# Patient Record
Sex: FEMALE | Race: WHITE | NOT HISPANIC OR LATINO | Employment: FULL TIME | URBAN - METROPOLITAN AREA
[De-identification: names, ages, dates, MRNs, and addresses within clinical notes are randomized per-mention and may not be internally consistent; named-entity substitution may affect disease eponyms.]

---

## 2018-05-28 ENCOUNTER — HOSPITAL ENCOUNTER (EMERGENCY)
Facility: HOSPITAL | Age: 23
Discharge: HOME | End: 2018-05-28
Attending: EMERGENCY MEDICINE | Admitting: EMERGENCY MEDICINE
Payer: MEDICAID

## 2018-05-28 VITALS
TEMPERATURE: 99 F | BODY MASS INDEX: 28.34 KG/M2 | HEART RATE: 96 BPM | DIASTOLIC BLOOD PRESSURE: 62 MMHG | RESPIRATION RATE: 20 BRPM | HEIGHT: 58 IN | SYSTOLIC BLOOD PRESSURE: 122 MMHG | OXYGEN SATURATION: 100 % | WEIGHT: 135 LBS

## 2018-05-28 DIAGNOSIS — R31.9 URINARY TRACT INFECTION WITH HEMATURIA, SITE UNSPECIFIED: ICD-10-CM

## 2018-05-28 DIAGNOSIS — N20.0 NEPHROLITHIASIS: Primary | ICD-10-CM

## 2018-05-28 DIAGNOSIS — N39.0 URINARY TRACT INFECTION WITH HEMATURIA, SITE UNSPECIFIED: ICD-10-CM

## 2018-05-28 DIAGNOSIS — N76.0 BACTERIAL VAGINOSIS: ICD-10-CM

## 2018-05-28 DIAGNOSIS — B96.89 BACTERIAL VAGINOSIS: ICD-10-CM

## 2018-05-28 LAB
ALBUMIN SERPL BCP-MCNC: 3.8 G/DL
ALP SERPL-CCNC: 87 U/L
ALT SERPL W/O P-5'-P-CCNC: 18 U/L
ANION GAP SERPL CALC-SCNC: 8 MMOL/L
AST SERPL-CCNC: 17 U/L
B-HCG UR QL: NEGATIVE
BACTERIA #/AREA URNS HPF: ABNORMAL /HPF
BACTERIA #/AREA URNS HPF: ABNORMAL /HPF
BACTERIA GENITAL QL WET PREP: ABNORMAL
BASOPHILS # BLD AUTO: 0.01 K/UL
BASOPHILS NFR BLD: 0.1 %
BILIRUB SERPL-MCNC: 0.5 MG/DL
BILIRUB UR QL STRIP: NEGATIVE
BILIRUB UR QL STRIP: NEGATIVE
BUN SERPL-MCNC: 13 MG/DL
CALCIUM SERPL-MCNC: 9.1 MG/DL
CHLORIDE SERPL-SCNC: 108 MMOL/L
CLARITY UR: ABNORMAL
CLARITY UR: CLEAR
CLUE CELLS VAG QL WET PREP: ABNORMAL
CO2 SERPL-SCNC: 22 MMOL/L
COLOR UR: YELLOW
COLOR UR: YELLOW
CREAT SERPL-MCNC: 0.8 MG/DL
CTP QC/QA: YES
DIFFERENTIAL METHOD: ABNORMAL
EOSINOPHIL # BLD AUTO: 0 K/UL
EOSINOPHIL NFR BLD: 0.4 %
ERYTHROCYTE [DISTWIDTH] IN BLOOD BY AUTOMATED COUNT: 13.4 %
EST. GFR  (AFRICAN AMERICAN): >60 ML/MIN/1.73 M^2
EST. GFR  (NON AFRICAN AMERICAN): >60 ML/MIN/1.73 M^2
FILAMENT FUNGI VAG WET PREP-#/AREA: ABNORMAL
GLUCOSE SERPL-MCNC: 109 MG/DL
GLUCOSE UR QL STRIP: NEGATIVE
GLUCOSE UR QL STRIP: NEGATIVE
HCT VFR BLD AUTO: 39.7 %
HGB BLD-MCNC: 13.2 G/DL
HGB UR QL STRIP: ABNORMAL
HGB UR QL STRIP: ABNORMAL
KETONES UR QL STRIP: ABNORMAL
KETONES UR QL STRIP: NEGATIVE
LEUKOCYTE ESTERASE UR QL STRIP: ABNORMAL
LEUKOCYTE ESTERASE UR QL STRIP: NEGATIVE
LYMPHOCYTES # BLD AUTO: 1.2 K/UL
LYMPHOCYTES NFR BLD: 11.2 %
MCH RBC QN AUTO: 30.6 PG
MCHC RBC AUTO-ENTMCNC: 33.2 G/DL
MCV RBC AUTO: 92 FL
MICROSCOPIC COMMENT: ABNORMAL
MICROSCOPIC COMMENT: ABNORMAL
MONOCYTES # BLD AUTO: 0.4 K/UL
MONOCYTES NFR BLD: 3.9 %
NEUTROPHILS # BLD AUTO: 9 K/UL
NEUTROPHILS NFR BLD: 84.2 %
NITRITE UR QL STRIP: NEGATIVE
NITRITE UR QL STRIP: NEGATIVE
PH UR STRIP: 7 [PH] (ref 5–8)
PH UR STRIP: 8 [PH] (ref 5–8)
PLATELET # BLD AUTO: 250 K/UL
PMV BLD AUTO: 10.8 FL
POTASSIUM SERPL-SCNC: 4.2 MMOL/L
PROT SERPL-MCNC: 7.2 G/DL
PROT UR QL STRIP: ABNORMAL
PROT UR QL STRIP: NEGATIVE
RBC # BLD AUTO: 4.31 M/UL
RBC #/AREA URNS HPF: 5 /HPF (ref 0–4)
RBC #/AREA URNS HPF: 5 /HPF (ref 0–4)
SODIUM SERPL-SCNC: 138 MMOL/L
SP GR UR STRIP: 1.01 (ref 1–1.03)
SP GR UR STRIP: 1.01 (ref 1–1.03)
SPECIMEN SOURCE: ABNORMAL
SQUAMOUS #/AREA URNS HPF: 0 /HPF
SQUAMOUS #/AREA URNS HPF: 6 /HPF
T VAGINALIS GENITAL QL WET PREP: ABNORMAL
URN SPEC COLLECT METH UR: ABNORMAL
URN SPEC COLLECT METH UR: ABNORMAL
UROBILINOGEN UR STRIP-ACNC: NEGATIVE EU/DL
UROBILINOGEN UR STRIP-ACNC: NEGATIVE EU/DL
WBC # BLD AUTO: 10.73 K/UL
WBC #/AREA URNS HPF: 35 /HPF (ref 0–5)
WBC #/AREA URNS HPF: 4 /HPF (ref 0–5)
WBC #/AREA VAG WET PREP: ABNORMAL
YEAST GENITAL QL WET PREP: ABNORMAL

## 2018-05-28 PROCEDURE — 87210 SMEAR WET MOUNT SALINE/INK: CPT

## 2018-05-28 PROCEDURE — 25000003 PHARM REV CODE 250: Performed by: PHYSICIAN ASSISTANT

## 2018-05-28 PROCEDURE — 99285 EMERGENCY DEPT VISIT HI MDM: CPT | Mod: 25

## 2018-05-28 PROCEDURE — 96361 HYDRATE IV INFUSION ADD-ON: CPT

## 2018-05-28 PROCEDURE — 63600175 PHARM REV CODE 636 W HCPCS: Performed by: PHYSICIAN ASSISTANT

## 2018-05-28 PROCEDURE — 81000 URINALYSIS NONAUTO W/SCOPE: CPT | Mod: 91

## 2018-05-28 PROCEDURE — 96365 THER/PROPH/DIAG IV INF INIT: CPT | Mod: 59

## 2018-05-28 PROCEDURE — 87088 URINE BACTERIA CULTURE: CPT

## 2018-05-28 PROCEDURE — 87186 SC STD MICRODIL/AGAR DIL: CPT

## 2018-05-28 PROCEDURE — 80053 COMPREHEN METABOLIC PANEL: CPT

## 2018-05-28 PROCEDURE — 96375 TX/PRO/DX INJ NEW DRUG ADDON: CPT

## 2018-05-28 PROCEDURE — 25500020 PHARM REV CODE 255: Performed by: EMERGENCY MEDICINE

## 2018-05-28 PROCEDURE — 85025 COMPLETE CBC W/AUTO DIFF WBC: CPT

## 2018-05-28 PROCEDURE — 87086 URINE CULTURE/COLONY COUNT: CPT

## 2018-05-28 PROCEDURE — 87491 CHLMYD TRACH DNA AMP PROBE: CPT

## 2018-05-28 PROCEDURE — 81025 URINE PREGNANCY TEST: CPT | Performed by: PHYSICIAN ASSISTANT

## 2018-05-28 PROCEDURE — 87077 CULTURE AEROBIC IDENTIFY: CPT

## 2018-05-28 RX ORDER — ONDANSETRON 4 MG/1
4 TABLET, ORALLY DISINTEGRATING ORAL
Status: DISCONTINUED | OUTPATIENT
Start: 2018-05-28 | End: 2018-05-28

## 2018-05-28 RX ORDER — TAMSULOSIN HYDROCHLORIDE 0.4 MG/1
0.4 CAPSULE ORAL DAILY
Qty: 10 CAPSULE | Refills: 0 | Status: SHIPPED | OUTPATIENT
Start: 2018-05-28 | End: 2020-02-20

## 2018-05-28 RX ORDER — METRONIDAZOLE 7.5 MG/G
1 GEL VAGINAL 2 TIMES DAILY
Qty: 70 G | Refills: 0 | Status: SHIPPED | OUTPATIENT
Start: 2018-05-28 | End: 2018-06-02

## 2018-05-28 RX ORDER — CEPHALEXIN 500 MG/1
500 CAPSULE ORAL EVERY 12 HOURS
Qty: 14 CAPSULE | Refills: 0 | Status: SHIPPED | OUTPATIENT
Start: 2018-05-28 | End: 2018-06-04

## 2018-05-28 RX ORDER — AZITHROMYCIN 250 MG/1
1000 TABLET, FILM COATED ORAL
Status: COMPLETED | OUTPATIENT
Start: 2018-05-28 | End: 2018-05-28

## 2018-05-28 RX ORDER — MORPHINE SULFATE 4 MG/ML
4 INJECTION, SOLUTION INTRAMUSCULAR; INTRAVENOUS
Status: COMPLETED | OUTPATIENT
Start: 2018-05-28 | End: 2018-05-28

## 2018-05-28 RX ORDER — ONDANSETRON 4 MG/1
4 TABLET, ORALLY DISINTEGRATING ORAL EVERY 6 HOURS PRN
Qty: 20 TABLET | Refills: 0 | Status: SHIPPED | OUTPATIENT
Start: 2018-05-28 | End: 2020-02-20

## 2018-05-28 RX ORDER — PROCHLORPERAZINE EDISYLATE 5 MG/ML
10 INJECTION INTRAMUSCULAR; INTRAVENOUS
Status: COMPLETED | OUTPATIENT
Start: 2018-05-28 | End: 2018-05-28

## 2018-05-28 RX ORDER — KETOROLAC TROMETHAMINE 30 MG/ML
15 INJECTION, SOLUTION INTRAMUSCULAR; INTRAVENOUS
Status: COMPLETED | OUTPATIENT
Start: 2018-05-28 | End: 2018-05-28

## 2018-05-28 RX ORDER — HYDROCODONE BITARTRATE AND ACETAMINOPHEN 10; 325 MG/1; MG/1
1 TABLET ORAL EVERY 4 HOURS PRN
Qty: 18 TABLET | Refills: 0 | Status: SHIPPED | OUTPATIENT
Start: 2018-05-28 | End: 2020-02-20

## 2018-05-28 RX ADMIN — PROCHLORPERAZINE EDISYLATE 10 MG: 5 INJECTION INTRAMUSCULAR; INTRAVENOUS at 12:05

## 2018-05-28 RX ADMIN — SODIUM CHLORIDE 1000 ML: 0.9 INJECTION, SOLUTION INTRAVENOUS at 12:05

## 2018-05-28 RX ADMIN — AZITHROMYCIN 1000 MG: 250 TABLET, FILM COATED ORAL at 03:05

## 2018-05-28 RX ADMIN — MORPHINE SULFATE 4 MG: 4 INJECTION INTRAVENOUS at 12:05

## 2018-05-28 RX ADMIN — IOHEXOL 75 ML: 350 INJECTION, SOLUTION INTRAVENOUS at 01:05

## 2018-05-28 RX ADMIN — KETOROLAC TROMETHAMINE 15 MG: 30 INJECTION, SOLUTION INTRAMUSCULAR at 02:05

## 2018-05-28 RX ADMIN — CEFTRIAXONE 1 G: 1 INJECTION, SOLUTION INTRAVENOUS at 03:05

## 2018-05-28 NOTE — ED PROVIDER NOTES
Encounter Date: 5/28/2018       History     Chief Complaint   Patient presents with    Emesis     n/v/d with RLQ abdominal pain x 3 hours, denies fever. LMP 4/26, admits to being sexually active with no protection     Casie Copeland, a 22 y.o. female that presents via EMS to the ED for N/V/D and RLQ abdominal pain that started about 3 hours PTA.  Patient states that the pain is stabbing in nature and constant with fluctuations in intensity.  Denies any history of abdominal surgeries.  Denies any sick contacts.  Patient does attest to some vaginal discharge and she does practice unprotected sex.  History of STDs in the past.  No treatments tried.         The history is provided by the patient.     Review of patient's allergies indicates:  No Known Allergies  History reviewed. No pertinent past medical history.  History reviewed. No pertinent surgical history.  History reviewed. No pertinent family history.  Social History   Substance Use Topics    Smoking status: Never Smoker    Smokeless tobacco: Not on file    Alcohol use Not on file     Review of Systems   Constitutional: Negative for fever.   Gastrointestinal: Positive for abdominal pain, diarrhea, nausea and vomiting.   Genitourinary: Positive for dysuria, urgency and vaginal discharge.   Skin: Negative for color change and rash.   Allergic/Immunologic: Negative for immunocompromised state.   Neurological: Negative for dizziness.   Hematological: Negative for adenopathy.   Psychiatric/Behavioral: Negative for agitation and confusion.   All other systems reviewed and are negative.      Physical Exam     Initial Vitals [05/28/18 1200]   BP Pulse Resp Temp SpO2   (!) 114/56 82 18 98.5 °F (36.9 °C) 99 %      MAP       75.33         Physical Exam    Nursing note and vitals reviewed.  Constitutional: She appears well-developed and well-nourished. She is not diaphoretic. No distress.   HENT:   Head: Normocephalic and atraumatic.   Right Ear: External ear  normal.   Left Ear: External ear normal.   Nose: Nose normal.   Mouth/Throat: Oropharynx is clear and moist.   Eyes: Conjunctivae and EOM are normal.   Neck: Normal range of motion.   Cardiovascular: Normal rate, regular rhythm and normal heart sounds.   Pulmonary/Chest: Breath sounds normal. No respiratory distress. She has no wheezes. She has no rhonchi. She has no rales.   Abdominal: Soft. Normal appearance and bowel sounds are normal. She exhibits no distension. There is tenderness in the right lower quadrant. There is no rigidity, no rebound, no guarding, no CVA tenderness and negative Cummings's sign.   Genitourinary: Uterus normal. Pelvic exam was performed with patient supine. Cervix exhibits discharge (white, thick ) and friability. Cervix exhibits no motion tenderness. Vaginal discharge (white, thick ) found.   Musculoskeletal: Normal range of motion.   Neurological: She is alert and oriented to person, place, and time.   Skin: Skin is warm and dry. Capillary refill takes less than 2 seconds. No rash noted. No erythema.   Psychiatric: She has a normal mood and affect. Thought content normal.         ED Course   Procedures  Labs Reviewed   URINALYSIS - Abnormal; Notable for the following:        Result Value    Appearance, UA Hazy (*)     Protein, UA Trace (*)     Occult Blood UA 1+ (*)     Leukocytes, UA 3+ (*)     All other components within normal limits   CBC W/ AUTO DIFFERENTIAL - Abnormal; Notable for the following:     Gran # (ANC) 9.0 (*)     Gran% 84.2 (*)     Lymph% 11.2 (*)     Mono% 3.9 (*)     All other components within normal limits   COMPREHENSIVE METABOLIC PANEL - Abnormal; Notable for the following:     CO2 22 (*)     All other components within normal limits   VAGINAL SCREEN - Abnormal; Notable for the following:     Clue Cells, Wet Prep Occasional (*)     WBC - Vaginal Screen Moderate (*)     Bacteria - Vaginal Screen Moderate (*)     All other components within normal limits   URINALYSIS  MICROSCOPIC - Abnormal; Notable for the following:     RBC, UA 5 (*)     WBC, UA 35 (*)     Bacteria, UA Moderate (*)     All other components within normal limits   URINALYSIS - Abnormal; Notable for the following:     Ketones, UA Trace (*)     Occult Blood UA 1+ (*)     All other components within normal limits    Narrative:     Please repeat   URINALYSIS MICROSCOPIC - Abnormal; Notable for the following:     RBC, UA 5 (*)     All other components within normal limits    Narrative:     Please repeat   CULTURE, URINE   C. TRACHOMATIS/N. GONORRHOEAE BY AMP DNA   CULTURE, URINE   POCT URINE PREGNANCY        Imaging Results          CT Abdomen Pelvis With Contrast (Final result)     Abnormal  Result time 05/28/18 13:52:41    Final result by Brian Bishop MD (05/28/18 13:52:41)                 Impression:      1. Mild right-sided hydroureteronephrosis secondary to a 4 mm calculus within the distal right ureter.  There is asymmetric enhancement pattern of the bilateral kidneys with heterogeneous appearance of the right renal lower pole which could indicate sequela of right-sided obstructive uropathy and/or potential right-sided pyelonephritis.  2. Additional right-sided nephrolithiasis.  3. Appendix not identified for which appendicitis cannot be entirely excluded; however, no convincing evidence for acute appendicitis.  Correlate clinically.  4. Left adnexal 1.9 cm probable functioning/hemorrhagic dominant follicle.  5. Small volume complex free fluid within the pelvis, nonspecific but can be seen with recent follicle or cyst rupture versus high proteinaceous fluid from infectious or inflammatory etiology including PID, among others.  Correlate clinically.  This report was flagged in Epic as abnormal.      Electronically signed by: Brian Bishop MD  Date:    05/28/2018  Time:    13:52             Narrative:    EXAMINATION:  CT ABDOMEN PELVIS WITH CONTRAST    CLINICAL HISTORY:  RLQ abdominal pain;    TECHNIQUE:  Low  dose axial images, sagittal and coronal reformations were obtained from the lung bases to the pubic symphysis following the IV administration of 75 mL of Omnipaque 350 contrast.  No oral contrast.    COMPARISON:  CT abdomen and pelvis 06/17/2013 and pelvic ultrasound 12/21/2011    FINDINGS:  Included lung bases are clear.  Base of the heart is within normal limits.    Liver, gallbladder, pancreas, spleen, stomach, duodenum bilateral adrenal glands are within normal limits.  No biliary ductal dilatation.    Bilateral kidneys are normal in size, shape and location.  There is mild right-sided hydroureteronephrosis secondary to a 4 mm calculus within the distal right ureter.  Additional few small nephroliths at the right renal lower pole.  No left hydronephrosis.  There is asymmetric enhancement pattern of the bilateral kidneys with heterogeneous appearance of the right renal lower pole.  Ureter is within normal limits.  Urinary bladder is suboptimally distended.  The uterus and right adnexal region are grossly within normal limits.  There is a 1.9 cm oval-shaped low-attenuation focus with peripheral enhancement at the left adnexal region suggestive of a functioning/hemorrhagic dominant follicle.  There is small volume intermediate attenuation free fluid.    Aorta is within normal.  No ascites, free air or lymphadenopathy.    Small fat containing umbilical hernia.  Appendix is not identified.  The terminal ileum is within normal limits.  No evidence of bowel obstruction or inflammation.  No pneumatosis or portal venous gas.    Included osseous structures appear intact.                                   Medical Decision Making:   Initial Assessment:   RLQ abdominal pain, N/V/D with acute onset  Differential Diagnosis:   Appendicitis, colitis, diverticulitis, ovarian torsion     Clinical Tests:   Lab Tests: Ordered and Reviewed  The following lab test(s) were unremarkable: CBC, CMP and Urinalysis  Radiological Study:  Ordered and Reviewed  ED Management:  Patient presents to ED evaluation of acute right-sided abdominal pain 3 hours prior to arrival with associated nausea, vomiting or diarrhea.  CBC and CMP showed no acute abnormalities.  UA initially looked contaminated and repeat UA showed 5 white blood cells and nitrite negative.  Urine was still sent for culture.  CT shows evidence of 4 mm stones.  Given questionable UA results and possibility of PID, patient was given IV rocephine in ED with azithromycin.  Cervix shows friability with white, thick discharge, vaginal screen shows + clue cells.  Patient will be prescribed metrogel and keflex for UTI.  Given strict return precautions including fever, inability to urinate, intractable nausea and vomiting.  Patient expressed understanding and is in agreement with this plan.  Case discussed with supervising physician who agrees with plan.                 Attending Attestation:     Physician Attestation Statement for NP/PA:   I have conducted a face to face encounter with this patient in addition to the NP/PA, due to NP/PA Request    Other NP/PA Attestation Additions:    History of Present Illness: Agree; 22-year-old feel presents emergency department complaining of a few hours of right lower quadrant pain.  She reports a constant, stabbing pain that fluctuates in intensity without any notable exacerbating or alleviating factors.  Admits to some vaginal discharge.  Reports nausea with one or 2 episodes of nonbloody, nonbilious emesis and one episode of loose stool earlier today.  Denies any fever.  Denies any dysuria.  No other symptoms reported at this time.   Physical Exam: Agree   Medical Decision Making: Agree; CT shows kidney stone; patient also with clue cells and apparent UTI.  Given some IV Rocephin and by mouth azithromycin as well as prescriptions for antibiotics, instructions on home management, follow-up with primary care physician and urology, reasons to return to the  emergency room.                    Clinical Impression:   The primary encounter diagnosis was Nephrolithiasis. Diagnoses of Bacterial vaginosis and Urinary tract infection with hematuria, site unspecified were also pertinent to this visit.                           Claire Ball PA-C  05/28/18 1724       Esteban Banda MD  05/29/18 0074

## 2018-05-28 NOTE — ED NOTES
Care assumed.  Pt with continued RLQ pain, nausea is resolving at present.  IV NS bolus infusing to gravity to 18 g to left AC, site clear.  Skin pink/warm/dry.  Resp =/unlabored.  Pt reports RLQ pain is now 8/10 after receiving morphine and compazine but pain is still severe.

## 2018-05-29 ENCOUNTER — PES CALL (OUTPATIENT)
Dept: ADMINISTRATIVE | Facility: CLINIC | Age: 23
End: 2018-05-29

## 2018-05-29 LAB
C TRACH DNA SPEC QL NAA+PROBE: NOT DETECTED
N GONORRHOEA DNA SPEC QL NAA+PROBE: NOT DETECTED

## 2018-05-30 LAB — BACTERIA UR CULT: NORMAL

## 2019-11-12 ENCOUNTER — NURSE TRIAGE (OUTPATIENT)
Dept: ADMINISTRATIVE | Facility: CLINIC | Age: 24
End: 2019-11-12

## 2019-11-12 ENCOUNTER — HOSPITAL ENCOUNTER (EMERGENCY)
Facility: HOSPITAL | Age: 24
Discharge: HOME | End: 2019-11-13
Attending: EMERGENCY MEDICINE
Payer: MEDICAID

## 2019-11-12 DIAGNOSIS — J06.9 VIRAL URI WITH COUGH: ICD-10-CM

## 2019-11-12 DIAGNOSIS — N30.00 ACUTE CYSTITIS WITHOUT HEMATURIA: Primary | ICD-10-CM

## 2019-11-12 DIAGNOSIS — R50.9 FEVER, UNSPECIFIED: ICD-10-CM

## 2019-11-12 LAB
B-HCG UR QL: NEGATIVE
BACTERIA #/AREA URNS HPF: ABNORMAL /HPF
BILIRUB UR QL STRIP: NEGATIVE
CLARITY UR: CLEAR
COLOR UR: YELLOW
CTP QC/QA: YES
GLUCOSE UR QL STRIP: NEGATIVE
HGB UR QL STRIP: ABNORMAL
HYALINE CASTS #/AREA URNS LPF: 0 /LPF
INFLUENZA A, MOLECULAR: NEGATIVE
INFLUENZA B, MOLECULAR: NEGATIVE
KETONES UR QL STRIP: NEGATIVE
LEUKOCYTE ESTERASE UR QL STRIP: ABNORMAL
MICROSCOPIC COMMENT: ABNORMAL
NITRITE UR QL STRIP: POSITIVE
PH UR STRIP: 6 [PH] (ref 5–8)
PROT UR QL STRIP: ABNORMAL
RBC #/AREA URNS HPF: 10 /HPF (ref 0–4)
SP GR UR STRIP: >=1.03 (ref 1–1.03)
SPECIMEN SOURCE: NORMAL
URN SPEC COLLECT METH UR: ABNORMAL
UROBILINOGEN UR STRIP-ACNC: 1 EU/DL
WBC #/AREA URNS HPF: 85 /HPF (ref 0–5)

## 2019-11-12 PROCEDURE — 81025 URINE PREGNANCY TEST: CPT | Performed by: EMERGENCY MEDICINE

## 2019-11-12 PROCEDURE — 96361 HYDRATE IV INFUSION ADD-ON: CPT

## 2019-11-12 PROCEDURE — 87088 URINE BACTERIA CULTURE: CPT

## 2019-11-12 PROCEDURE — 99284 EMERGENCY DEPT VISIT MOD MDM: CPT | Mod: 25

## 2019-11-12 PROCEDURE — 87186 SC STD MICRODIL/AGAR DIL: CPT

## 2019-11-12 PROCEDURE — 81000 URINALYSIS NONAUTO W/SCOPE: CPT

## 2019-11-12 PROCEDURE — 87502 INFLUENZA DNA AMP PROBE: CPT

## 2019-11-12 PROCEDURE — 87077 CULTURE AEROBIC IDENTIFY: CPT

## 2019-11-12 PROCEDURE — 96375 TX/PRO/DX INJ NEW DRUG ADDON: CPT

## 2019-11-12 PROCEDURE — 63600175 PHARM REV CODE 636 W HCPCS: Performed by: EMERGENCY MEDICINE

## 2019-11-12 PROCEDURE — 87086 URINE CULTURE/COLONY COUNT: CPT

## 2019-11-12 PROCEDURE — 96365 THER/PROPH/DIAG IV INF INIT: CPT

## 2019-11-12 RX ORDER — ONDANSETRON 2 MG/ML
4 INJECTION INTRAMUSCULAR; INTRAVENOUS
Status: COMPLETED | OUTPATIENT
Start: 2019-11-12 | End: 2019-11-12

## 2019-11-12 RX ORDER — KETOROLAC TROMETHAMINE 30 MG/ML
15 INJECTION, SOLUTION INTRAMUSCULAR; INTRAVENOUS
Status: COMPLETED | OUTPATIENT
Start: 2019-11-12 | End: 2019-11-12

## 2019-11-12 RX ADMIN — KETOROLAC TROMETHAMINE 15 MG: 30 INJECTION, SOLUTION INTRAMUSCULAR at 11:11

## 2019-11-12 RX ADMIN — ONDANSETRON 4 MG: 2 INJECTION INTRAMUSCULAR; INTRAVENOUS at 11:11

## 2019-11-12 RX ADMIN — SODIUM CHLORIDE, SODIUM LACTATE, POTASSIUM CHLORIDE, AND CALCIUM CHLORIDE 1000 ML: .6; .31; .03; .02 INJECTION, SOLUTION INTRAVENOUS at 11:11

## 2019-11-13 VITALS
SYSTOLIC BLOOD PRESSURE: 108 MMHG | DIASTOLIC BLOOD PRESSURE: 65 MMHG | TEMPERATURE: 99 F | HEART RATE: 85 BPM | HEIGHT: 59 IN | RESPIRATION RATE: 14 BRPM | BODY MASS INDEX: 24.19 KG/M2 | WEIGHT: 120 LBS | OXYGEN SATURATION: 98 %

## 2019-11-13 PROCEDURE — 63600175 PHARM REV CODE 636 W HCPCS: Performed by: EMERGENCY MEDICINE

## 2019-11-13 RX ORDER — CEPHALEXIN 500 MG/1
500 CAPSULE ORAL EVERY 8 HOURS
Qty: 21 CAPSULE | Refills: 0 | Status: SHIPPED | OUTPATIENT
Start: 2019-11-13 | End: 2019-11-20

## 2019-11-13 RX ADMIN — CEFTRIAXONE 1 G: 1 INJECTION, SOLUTION INTRAVENOUS at 12:11

## 2019-11-13 NOTE — ED NOTES
"Patient states she has been having a headache, body aches, and fever x a "couple of days." She denies any dysuria however states she has had cloudy urine but thinks "that's because im just not healthy." Patient states she has not had the flu shot yet this year. She also admits to intermittent RLQ pain states its sharp but comes at random times.   "

## 2019-11-13 NOTE — TELEPHONE ENCOUNTER
Reason for Disposition   Unable to walk, or can only walk with assistance (e.g., requires support)    Additional Information   Negative: Difficult to awaken or acting confused (e.g., disoriented, slurred speech)   Negative: [1] Weakness of the face, arm or leg on one side of the body AND [2] new onset   Negative: [1] Numbness of the face, arm or leg on one side of the body AND [2] new onset   Negative: [1] Loss of speech or garbled speech AND [2] new onset   Negative: Passed out (i.e., lost consciousness, collapsed and was not responding)   Negative: Sounds like a life-threatening emergency to the triager   Negative: Followed a head injury within last 3 days   Negative: Pregnant   Negative: Traumatic Brain Injury (TBI) is suspected    Protocols used: HEADACHE-A-AH    Pt called to report she has a severe headache and stated she cannot walk normal. Pt stated she is now wobbling. Per triage protocol, Pt advised to go to ED now and not to drive. Pt verbalized understanding.

## 2019-11-13 NOTE — ED PROVIDER NOTES
Encounter Date: 11/12/2019    SCRIBE #1 NOTE: I, Kerri Mcginnis, am scribing for, and in the presence of,  Dr. Quiroz. I have scribed the entire note.       History     Chief Complaint   Patient presents with    Fever     Patient presents to ED secondary to subjective fever and migraine x2 days. Also accompanied by cough, congestion. Last dose of equate taken one hour pta.      Casie Copeland is a 24 y.o. female who  has no past medical history on file.    The patient presents to the ED due to multiple complaints, including cough/congestion, fever, body aches and headache.   Patient reports cough/congestion has been ongoing for the last week.   She reports 2 days ago, she noticed fever, nausea, and headache that has been persistent.   She endorses history of migraine headaches, and states this headache is similar in character to prior headaches.   She denies any chills, vomiting, diarrhea, abdominal pain, neck pain/stiffness, numbness/weakness, urinary symptoms, or any other complaints.   She denies any known sick contacts.    The history is provided by the patient.     Review of patient's allergies indicates:   Allergen Reactions    Clindamycin      History reviewed. No pertinent past medical history.  History reviewed. No pertinent surgical history.  History reviewed. No pertinent family history.  Social History     Tobacco Use    Smoking status: Never Smoker   Substance Use Topics    Alcohol use: Not on file    Drug use: Not on file     Review of Systems   Constitutional: Positive for fever. Negative for chills.   HENT: Positive for congestion. Negative for sore throat.    Respiratory: Positive for cough. Negative for shortness of breath.    Cardiovascular: Negative for chest pain.   Gastrointestinal: Positive for nausea. Negative for vomiting.   Genitourinary: Negative for dysuria, frequency and urgency.   Musculoskeletal: Positive for myalgias. Negative for back pain, neck pain and neck stiffness.    Skin: Negative for rash and wound.   Neurological: Positive for headaches. Negative for syncope and weakness.   Hematological: Does not bruise/bleed easily.   Psychiatric/Behavioral: Negative for agitation, behavioral problems and confusion.       Physical Exam     Initial Vitals [11/12/19 2210]   BP Pulse Resp Temp SpO2   111/74 (!) 126 18 99.6 °F (37.6 °C) 98 %      MAP       --         Physical Exam    Nursing note and vitals reviewed.  Constitutional: She appears well-developed and well-nourished. She is not diaphoretic. No distress.   Well-appearing, NAD.   HENT:   Head: Normocephalic and atraumatic.   Mouth/Throat: Oropharynx is clear and moist.   Eyes: EOM are normal. Pupils are equal, round, and reactive to light.   Neck: No tracheal deviation present.   Cardiovascular: Regular rhythm, normal heart sounds and intact distal pulses. Tachycardia present.    Pulmonary/Chest: Breath sounds normal. No stridor. No respiratory distress. She has no wheezes.   Abdominal: Soft. Bowel sounds are normal. She exhibits no distension and no mass. There is no tenderness.   Musculoskeletal: Normal range of motion. She exhibits no edema.   Neurological: She is alert and oriented to person, place, and time. No cranial nerve deficit or sensory deficit.   Skin: Skin is warm and dry. Capillary refill takes less than 2 seconds. No rash noted.   Psychiatric: She has a normal mood and affect. Her behavior is normal. Thought content normal.         ED Course   Procedures  Labs Reviewed   CULTURE, URINE - Abnormal; Notable for the following components:       Result Value    Urine Culture, Routine   (*)     Value: PRESUMPTIVE E COLI  >100,000 cfu/ml  Identification and susceptibility pending      All other components within normal limits    Narrative:     Preferred Collection Type->Urine, Clean Catch   URINALYSIS, REFLEX TO URINE CULTURE - Abnormal; Notable for the following components:    Specific Gravity, UA >=1.030 (*)     Protein,  UA 1+ (*)     Occult Blood UA 3+ (*)     Nitrite, UA Positive (*)     Leukocytes, UA 1+ (*)     All other components within normal limits    Narrative:     Preferred Collection Type->Urine, Clean Catch   URINALYSIS MICROSCOPIC - Abnormal; Notable for the following components:    RBC, UA 10 (*)     WBC, UA 85 (*)     Bacteria Many (*)     All other components within normal limits    Narrative:     Preferred Collection Type->Urine, Clean Catch   INFLUENZA A & B BY MOLECULAR   POCT URINE PREGNANCY             Medical Decision Making:   History:   Old Medical Records: I decided to obtain old medical records.  Initial Assessment:   25 yo F with multiple complaints, including cough/congestion, fever, body aches, headache.  Mild tachycardia on arrival, vitals otherwise reassuring.  Will obtain UA, treat symptomatically with IVF, and reassess.  Differential Diagnosis:   Differential Diagnosis includes, but is not limited to:  Sepsis, bacteremia, UTI, pneumonia, cellulitis, abscess, indwelling line/catheter infection, cholecystitis, viral URI, gastroenteritis, viral syndrome, sinusitis, otitis media/externa, neoplasm, drug reaction, serotonin syndrome, intoxication/withdrawal syndrome.    Clinical Tests:   Lab Tests: Ordered and Reviewed  ED Management:  UA consistent with UTI. IV Rocephin given in ED.  On reassessment, vitals have improved, patient remains well-appearing.   Will RX Keflex for UTI, recommend close f/u with PCP or return to ED for worsening symptoms, pain, fever, N/V, or any other concerns.    On re-evaluation, the patient's status has improved.  After complete ED evaluation, clinical impression is most consistent with UTI, fever.  PCP follow-up within 2-3 days was recommended.    After taking into careful account the patient's history, physical exam findings, as well as empirical and objective data obtained throughout ED workup, I feel no emergent medical condition has been identified. No further evaluation  or admission was felt to be required, and the patient is stable for discharge from the ED. The patient and any additional family present were updated with test results, overall clinical impression, and recommended further plan of care, including discharge instructions as provided and outpatient follow-up for continued evaluation and management as needed. All questions were answered. The patient expressed understanding and agreed with current plan for discharge and follow-up plan of care. Strict ED return precautions were provided, including return/worsening of current symptoms, new symptoms, or any other concerns.                                   Clinical Impression:       ICD-10-CM ICD-9-CM   1. Acute cystitis without hematuria N30.00 595.0   2. Fever, unspecified R50.9 780.60   3. Viral URI with cough J06.9 465.9    B97.89        Disposition:   Disposition: Discharged  Condition: Stable      I, Dr. Ignacio Quiroz, personally performed the services described in this documentation. All medical record entries made by the scribe were at my direction and in my presence.  I have reviewed the chart and agree that the record reflects my personal performance and is accurate and complete.     Ignacio Quiroz MD.           Ignacio Quiroz MD  11/14/19 2746

## 2019-11-15 LAB — BACTERIA UR CULT: ABNORMAL

## 2020-01-03 ENCOUNTER — HOSPITAL ENCOUNTER (EMERGENCY)
Facility: HOSPITAL | Age: 25
Discharge: HOME | End: 2020-01-03
Attending: EMERGENCY MEDICINE
Payer: MEDICAID

## 2020-01-03 VITALS
SYSTOLIC BLOOD PRESSURE: 119 MMHG | HEIGHT: 58 IN | DIASTOLIC BLOOD PRESSURE: 69 MMHG | OXYGEN SATURATION: 99 % | WEIGHT: 125 LBS | BODY MASS INDEX: 26.24 KG/M2 | RESPIRATION RATE: 16 BRPM | TEMPERATURE: 98 F | HEART RATE: 94 BPM

## 2020-01-03 DIAGNOSIS — R51.9 NONINTRACTABLE HEADACHE, UNSPECIFIED CHRONICITY PATTERN, UNSPECIFIED HEADACHE TYPE: Primary | ICD-10-CM

## 2020-01-03 LAB
B-HCG UR QL: NEGATIVE
BUN SERPL-MCNC: 16 MG/DL (ref 6–30)
CHLORIDE SERPL-SCNC: 105 MMOL/L (ref 95–110)
CREAT SERPL-MCNC: 0.7 MG/DL (ref 0.5–1.4)
CTP QC/QA: YES
GLUCOSE SERPL-MCNC: 82 MG/DL (ref 70–110)
HCT VFR BLD CALC: 42 %PCV (ref 36–54)
POC IONIZED CALCIUM: 1.13 MMOL/L (ref 1.06–1.42)
POC TCO2 (MEASURED): 23 MMOL/L (ref 23–29)
POTASSIUM BLD-SCNC: 3.7 MMOL/L (ref 3.5–5.1)
SAMPLE: NORMAL
SODIUM BLD-SCNC: 138 MMOL/L (ref 136–145)

## 2020-01-03 PROCEDURE — 99284 EMERGENCY DEPT VISIT MOD MDM: CPT | Mod: 25

## 2020-01-03 PROCEDURE — 99284 EMERGENCY DEPT VISIT MOD MDM: CPT | Mod: ,,, | Performed by: PHYSICIAN ASSISTANT

## 2020-01-03 PROCEDURE — 63600175 PHARM REV CODE 636 W HCPCS: Performed by: PHYSICIAN ASSISTANT

## 2020-01-03 PROCEDURE — 81025 URINE PREGNANCY TEST: CPT | Performed by: PHYSICIAN ASSISTANT

## 2020-01-03 PROCEDURE — 96374 THER/PROPH/DIAG INJ IV PUSH: CPT

## 2020-01-03 PROCEDURE — 96361 HYDRATE IV INFUSION ADD-ON: CPT

## 2020-01-03 PROCEDURE — 99284 PR EMERGENCY DEPT VISIT,LEVEL IV: ICD-10-PCS | Mod: ,,, | Performed by: PHYSICIAN ASSISTANT

## 2020-01-03 PROCEDURE — 25000003 PHARM REV CODE 250: Performed by: PHYSICIAN ASSISTANT

## 2020-01-03 RX ORDER — PROCHLORPERAZINE EDISYLATE 5 MG/ML
10 INJECTION INTRAMUSCULAR; INTRAVENOUS
Status: COMPLETED | OUTPATIENT
Start: 2020-01-03 | End: 2020-01-03

## 2020-01-03 RX ORDER — ACETAMINOPHEN 500 MG
1000 TABLET ORAL
Status: COMPLETED | OUTPATIENT
Start: 2020-01-03 | End: 2020-01-03

## 2020-01-03 RX ADMIN — ACETAMINOPHEN 1000 MG: 500 TABLET ORAL at 03:01

## 2020-01-03 RX ADMIN — SODIUM CHLORIDE 1000 ML: 0.9 INJECTION, SOLUTION INTRAVENOUS at 03:01

## 2020-01-03 RX ADMIN — PROCHLORPERAZINE EDISYLATE 10 MG: 5 INJECTION INTRAMUSCULAR; INTRAVENOUS at 03:01

## 2020-01-03 NOTE — ED PROVIDER NOTES
Encounter Date: 1/3/2020       History     Chief Complaint   Patient presents with    Migraine     x5hrs, reports photosensitivty.      Patient is a 24 year old female with no significant PMHx. She presents to the ED for headache. She reports having a left frontal headache onset today approximately five hours PTA. Hx of similar headache in the past. Describes pain as constant and throbbing. Reports pain waxing and waning in intensity. Rates pain 6/10. Reports intermittent blurred vision and nausea. Denies hx of anticoagulation. She denies visual disturbances, slurred speech, paresthesias, gait abnormalities, or motor weakness. She denies fever,chills, vomiting, sob, chest pain, abd pain, dysuria, diarrhea, or constipation. She is a non smoker and denies alcohol use.    The history is provided by the patient and medical records. No  was used.     Review of patient's allergies indicates:   Allergen Reactions    Clindamycin      No past medical history on file.  No past surgical history on file.  No family history on file.  Social History     Tobacco Use    Smoking status: Never Smoker   Substance Use Topics    Alcohol use: Not on file    Drug use: Not on file     Review of Systems   Constitutional: Negative for fever.   HENT: Negative for sore throat.    Eyes: Positive for visual disturbance.   Respiratory: Negative for shortness of breath.    Cardiovascular: Negative for chest pain.   Gastrointestinal: Positive for nausea. Negative for abdominal pain and vomiting.   Genitourinary: Negative for dysuria.   Musculoskeletal: Negative for back pain.   Skin: Negative for rash.   Neurological: Positive for headaches. Negative for weakness.   Hematological: Does not bruise/bleed easily.       Physical Exam     Initial Vitals [01/03/20 0259]   BP Pulse Resp Temp SpO2   119/69 94 16 98.2 °F (36.8 °C) 99 %      MAP       --         Physical Exam    Vitals reviewed.  Constitutional: She appears  well-developed and well-nourished. No distress.   HENT:   Head: Normocephalic.   Eyes: Conjunctivae and EOM are normal. Pupils are equal, round, and reactive to light.   Neck: Normal range of motion.   Cardiovascular: Normal rate and regular rhythm.   No murmur heard.  Pulmonary/Chest: Breath sounds normal. No respiratory distress. She has no wheezes. She has no rales.   Abdominal: Soft. Bowel sounds are normal. She exhibits no distension. There is no tenderness.   Musculoskeletal: Normal range of motion.   Neurological: She is alert and oriented to person, place, and time. She has normal strength. No sensory deficit. Coordination and gait normal.   Motor strength of b/l UE and LE 5/5. Finger to nose negative. Heel to shin negative. Pronator drift negative. No facial droop or asymmetry. Speech is clear. Tongue protrudes midline with no fasciculations.   Skin: Skin is warm and dry. No erythema.         ED Course   Procedures  Labs Reviewed   POCT URINE PREGNANCY - Normal   ISTAT PROCEDURE             Medical Decision Making:   History:   Old Medical Records: I decided to obtain old medical records.  Clinical Tests:   Lab Tests: Ordered and Reviewed       APC / Resident Notes:   Patient is a 24 year old female presents to the ED for emergent evaluation of headache.     Will order istat and migraine cocktail for symptomatic relief. UPT negative. Will continue to monitor.     Differential diagnoses include, but are not limited to: atypical migraine, tension headache, dehydration, or electrolyte imbalance.     istat WNL. Patient reassessed, reports symptoms resolved with ED management. I have discussed emergency department findings, and plan with the patient. Will discharge home with F/U with neurology and PCP. Patient verbalizes understanding of plan and agrees. Return precautions given.     I have discussed and reviewed with my supervising physician.          Clinical Impression:       ICD-10-CM ICD-9-CM   1.  Nonintractable headache, unspecified chronicity pattern, unspecified headache type R51 784.0         Disposition:   Disposition: Discharged  Condition: Stable                     Erin Feng PA-C  01/03/20 0919

## 2020-01-03 NOTE — ED TRIAGE NOTES
Patient c/o migraine that started earlier tonight. Reports having migraines frequently lately with the last one being 3 days. Took tylenol extra strength around 9 pm without relief. + nausea, denies vomiting. 6/10 pain; described as throbbing      Adult Physical Assessment  LOC: Casie Copeland, 24 y.o. female verified via two identifiers.  The patient is awake, alert, oriented and speaking appropriately at this time.  APPEARANCE: Patient resting comfortably and appears to be in no acute distress at this time. Patient is clean and well groomed, patient's clothing is properly fastened.  SKIN:The skin is warm and dry, color consistent with ethnicity, patient has normal skin turgor and moist mucus membranes, skin intact, no breakdown or brusing noted.  MUSCULOSKELETAL: Patient moving all extremities well, no obvious swelling or deformities noted.  RESPIRATORY: Airway is open and patent, respirations are spontaneous, patient has a normal effort and rate, no accessory muscle use noted.  CARDIAC: Patient has a normal rate and rhythm, no periphreal edema noted in any extremity, capillary refill < 3 seconds in all extremities  ABDOMEN: Soft and non tender to palpation, no abdominal distention noted. Bowel sounds present in all four quadrants.  NEUROLOGIC: Eyes open spontaneously, behavior appropriate to situation, follows commands, facial expression symmetrical, bilateral hand grasp equal and even, purposeful motor response noted, normal sensation in all extremities when touched with a finger.

## 2020-02-20 ENCOUNTER — OFFICE VISIT (OUTPATIENT)
Dept: OBSTETRICS AND GYNECOLOGY | Facility: CLINIC | Age: 25
End: 2020-02-20
Payer: MEDICAID

## 2020-02-20 ENCOUNTER — DOCUMENTATION ONLY (OUTPATIENT)
Dept: PHARMACY | Facility: CLINIC | Age: 25
End: 2020-02-20

## 2020-02-20 VITALS
SYSTOLIC BLOOD PRESSURE: 102 MMHG | HEIGHT: 58 IN | BODY MASS INDEX: 25.42 KG/M2 | WEIGHT: 121.13 LBS | DIASTOLIC BLOOD PRESSURE: 68 MMHG

## 2020-02-20 DIAGNOSIS — Z11.3 SCREENING FOR STDS (SEXUALLY TRANSMITTED DISEASES): ICD-10-CM

## 2020-02-20 DIAGNOSIS — Z30.09 EVALUATION REGARDING CONTRACEPTION OPTIONS: Primary | ICD-10-CM

## 2020-02-20 LAB
B-HCG UR QL: NEGATIVE
CTP QC/QA: YES

## 2020-02-20 PROCEDURE — 99203 OFFICE O/P NEW LOW 30 MIN: CPT | Mod: S$PBB,,, | Performed by: OBSTETRICS & GYNECOLOGY

## 2020-02-20 PROCEDURE — 99999 PR PBB SHADOW E&M-EST. PATIENT-LVL III: CPT | Mod: PBBFAC,,, | Performed by: OBSTETRICS & GYNECOLOGY

## 2020-02-20 PROCEDURE — 81025 URINE PREGNANCY TEST: CPT | Mod: PBBFAC,PO | Performed by: OBSTETRICS & GYNECOLOGY

## 2020-02-20 PROCEDURE — 87491 CHLMYD TRACH DNA AMP PROBE: CPT

## 2020-02-20 PROCEDURE — 99999 PR PBB SHADOW E&M-EST. PATIENT-LVL III: ICD-10-PCS | Mod: PBBFAC,,, | Performed by: OBSTETRICS & GYNECOLOGY

## 2020-02-20 PROCEDURE — 99213 OFFICE O/P EST LOW 20 MIN: CPT | Mod: PBBFAC,PO | Performed by: OBSTETRICS & GYNECOLOGY

## 2020-02-20 PROCEDURE — 99203 PR OFFICE/OUTPT VISIT, NEW, LEVL III, 30-44 MIN: ICD-10-PCS | Mod: S$PBB,,, | Performed by: OBSTETRICS & GYNECOLOGY

## 2020-02-20 RX ORDER — MEDROXYPROGESTERONE ACETATE 150 MG/ML
150 INJECTION, SUSPENSION INTRAMUSCULAR
Qty: 1 ML | Refills: 3 | Status: SHIPPED | OUTPATIENT
Start: 2020-02-20 | End: 2020-10-13 | Stop reason: ALTCHOICE

## 2020-02-20 NOTE — PROGRESS NOTES
Casie ALECIA Min received IM Depo Provera to the right upper gluteal lower hip region on 2/20/2020.    The patient was instructed to get the next injection on 05/14/2020.    Lot Number: [id number]  Expiration Date: 09/30/2021

## 2020-02-20 NOTE — PROGRESS NOTES
"  Subjective:       Patient ID: Casie Copeland is a 24 y.o. female.    Chief Complaint:  Contraception      History of Present Illness  23yo G0 presents to discuss birth control options.  Previously on DepoProvera while in high school, did well amenorrheic but gained weight.  Cycles are regular, monthly, but the first two days are heavy and very painful.  She is sexually active, uses condoms.  No other complaints today.     GYN & OB History  Patient's last menstrual period was 02/15/2020.   Date of Last Pap: No result found    OB History   No data available       Review of Systems  Review of Systems: neg x 10, except as per HPI        Objective:    Physical Exam     /68   Ht 4' 10" (1.473 m)   Wt 54.9 kg (121 lb 2.3 oz)   LMP 02/15/2020   BMI 25.32 kg/m²     Gen: NAD  Resp: Normal respiratory effort  Abd: soft, NT  Pelvic: deferred  Ext: normal ROM  Psych: appropriate affect  Neuro: grossly intact     UPT neg    Assessment:        1. Evaluation regarding contraception options              Plan:      Contraception counseling done.  Desires Nexplanon - paperwork submitted today.  Will give DepoProvera until Nexplanon arrives.  Encourage condom use.  Counseling done, precautions given, all questions answered.  RTC for Nexplanon insertion.      "

## 2020-02-24 LAB
C TRACH DNA SPEC QL NAA+PROBE: NOT DETECTED
N GONORRHOEA DNA SPEC QL NAA+PROBE: NOT DETECTED

## 2020-03-03 ENCOUNTER — TELEPHONE (OUTPATIENT)
Dept: OBSTETRICS AND GYNECOLOGY | Facility: CLINIC | Age: 25
End: 2020-03-03

## 2020-03-04 ENCOUNTER — OFFICE VISIT (OUTPATIENT)
Dept: OBSTETRICS AND GYNECOLOGY | Facility: CLINIC | Age: 25
End: 2020-03-04
Payer: MEDICAID

## 2020-03-04 VITALS
WEIGHT: 120.56 LBS | SYSTOLIC BLOOD PRESSURE: 108 MMHG | BODY MASS INDEX: 25.31 KG/M2 | DIASTOLIC BLOOD PRESSURE: 70 MMHG | HEIGHT: 58 IN

## 2020-03-04 DIAGNOSIS — Z30.017 NEXPLANON INSERTION: Primary | ICD-10-CM

## 2020-03-04 LAB
B-HCG UR QL: NEGATIVE
CTP QC/QA: YES

## 2020-03-04 PROCEDURE — 99212 OFFICE O/P EST SF 10 MIN: CPT | Mod: PBBFAC,PO | Performed by: OBSTETRICS & GYNECOLOGY

## 2020-03-04 PROCEDURE — 99499 NO LOS: ICD-10-PCS | Mod: S$PBB,,, | Performed by: OBSTETRICS & GYNECOLOGY

## 2020-03-04 PROCEDURE — 81025 URINE PREGNANCY TEST: CPT | Mod: PBBFAC,PO | Performed by: OBSTETRICS & GYNECOLOGY

## 2020-03-04 PROCEDURE — 99999 PR PBB SHADOW E&M-EST. PATIENT-LVL II: ICD-10-PCS | Mod: PBBFAC,,, | Performed by: OBSTETRICS & GYNECOLOGY

## 2020-03-04 PROCEDURE — 99999 PR PBB SHADOW E&M-EST. PATIENT-LVL II: CPT | Mod: PBBFAC,,, | Performed by: OBSTETRICS & GYNECOLOGY

## 2020-03-04 PROCEDURE — 11981 INSERTION OF NEXPLANON: ICD-10-PCS | Mod: S$PBB,,, | Performed by: OBSTETRICS & GYNECOLOGY

## 2020-03-04 PROCEDURE — 99499 UNLISTED E&M SERVICE: CPT | Mod: S$PBB,,, | Performed by: OBSTETRICS & GYNECOLOGY

## 2020-03-04 PROCEDURE — 11981 INSERTION DRUG DLVR IMPLANT: CPT | Mod: PBBFAC,PO | Performed by: OBSTETRICS & GYNECOLOGY

## 2020-03-04 RX ORDER — ETONOGESTREL 68 MG/1
IMPLANT SUBCUTANEOUS
COMMUNITY
Start: 2020-02-26

## 2020-03-04 NOTE — PROCEDURES
Insertion of Nexplanon  Date/Time: 3/4/2020 10:45 AM  Performed by: Dayana Brito MD  Authorized by: Dayana Brito MD     Consent obtained:  Verbal and written  Consent given by:  Patient  Patient questions answered: yes    Patient agrees, verbalizes understanding, and wants to proceed: yes    Educational handouts given: yes    Instructions and paperwork completed: yes    Prepped with: povidone-iodine    Local anesthetic:  Lidocaine 1%  The site was cleaned and prepped in a sterile fashion: yes    Left/right:  Left   68 mg etonogestrel 68 mg  Preloaded Implanon trocar was placed subdermally: yes    Visualization of implant was obtained: yes    Nexplanon was inserted and trocar removed: yes    Visualization of notch in stilette and palpitation of device: yes    Palpitation confirms placement by provider and patient: yes    Site was closed with steri-strips and pressure bandage applied: yes

## 2020-03-24 ENCOUNTER — NURSE TRIAGE (OUTPATIENT)
Dept: ADMINISTRATIVE | Facility: CLINIC | Age: 25
End: 2020-03-24

## 2020-04-01 ENCOUNTER — TELEPHONE (OUTPATIENT)
Dept: OBSTETRICS AND GYNECOLOGY | Facility: CLINIC | Age: 25
End: 2020-04-01

## 2020-06-16 ENCOUNTER — TELEPHONE (OUTPATIENT)
Dept: OBSTETRICS AND GYNECOLOGY | Facility: CLINIC | Age: 25
End: 2020-06-16

## 2020-08-03 ENCOUNTER — NURSE TRIAGE (OUTPATIENT)
Dept: ADMINISTRATIVE | Facility: CLINIC | Age: 25
End: 2020-08-03

## 2020-08-03 ENCOUNTER — HOSPITAL ENCOUNTER (EMERGENCY)
Facility: HOSPITAL | Age: 25
Discharge: HOME | End: 2020-08-03
Attending: EMERGENCY MEDICINE
Payer: MEDICAID

## 2020-08-03 VITALS
HEART RATE: 71 BPM | WEIGHT: 125 LBS | OXYGEN SATURATION: 99 % | HEIGHT: 57 IN | RESPIRATION RATE: 20 BRPM | SYSTOLIC BLOOD PRESSURE: 95 MMHG | TEMPERATURE: 98 F | DIASTOLIC BLOOD PRESSURE: 54 MMHG | BODY MASS INDEX: 26.97 KG/M2

## 2020-08-03 DIAGNOSIS — R42 DIZZINESS: ICD-10-CM

## 2020-08-03 DIAGNOSIS — R07.89 ATYPICAL CHEST PAIN: ICD-10-CM

## 2020-08-03 DIAGNOSIS — R20.2 PARESTHESIAS IN LEFT HAND: Primary | ICD-10-CM

## 2020-08-03 DIAGNOSIS — M79.602 LEFT ARM PAIN: ICD-10-CM

## 2020-08-03 DIAGNOSIS — F41.9 ANXIETY: ICD-10-CM

## 2020-08-03 DIAGNOSIS — R00.2 PALPITATIONS: ICD-10-CM

## 2020-08-03 LAB
ALBUMIN SERPL BCP-MCNC: 4.2 G/DL (ref 3.5–5.2)
ALP SERPL-CCNC: 86 U/L (ref 55–135)
ALT SERPL W/O P-5'-P-CCNC: 14 U/L (ref 10–44)
ANION GAP SERPL CALC-SCNC: 7 MMOL/L (ref 8–16)
AST SERPL-CCNC: 17 U/L (ref 10–40)
B-HCG UR QL: NEGATIVE
BILIRUB SERPL-MCNC: 0.3 MG/DL (ref 0.1–1)
BILIRUB UR QL STRIP: NEGATIVE
BUN SERPL-MCNC: 9 MG/DL (ref 6–20)
CALCIUM SERPL-MCNC: 8.8 MG/DL (ref 8.7–10.5)
CHLORIDE SERPL-SCNC: 108 MMOL/L (ref 95–110)
CLARITY UR: CLEAR
CO2 SERPL-SCNC: 24 MMOL/L (ref 23–29)
COLOR UR: YELLOW
CREAT SERPL-MCNC: 0.8 MG/DL (ref 0.5–1.4)
CTP QC/QA: YES
D DIMER PPP IA.FEU-MCNC: <0.19 MG/L FEU
ERYTHROCYTE [DISTWIDTH] IN BLOOD BY AUTOMATED COUNT: 12.6 % (ref 11.5–14.5)
EST. GFR  (AFRICAN AMERICAN): >60 ML/MIN/1.73 M^2
EST. GFR  (NON AFRICAN AMERICAN): >60 ML/MIN/1.73 M^2
GLUCOSE SERPL-MCNC: 109 MG/DL (ref 70–110)
GLUCOSE UR QL STRIP: NEGATIVE
HCT VFR BLD AUTO: 38.3 % (ref 37–48.5)
HGB BLD-MCNC: 12.9 G/DL (ref 12–16)
HGB UR QL STRIP: NEGATIVE
KETONES UR QL STRIP: NEGATIVE
LEUKOCYTE ESTERASE UR QL STRIP: NEGATIVE
MCH RBC QN AUTO: 30.9 PG (ref 27–31)
MCHC RBC AUTO-ENTMCNC: 33.7 G/DL (ref 32–36)
MCV RBC AUTO: 92 FL (ref 82–98)
NITRITE UR QL STRIP: NEGATIVE
PH UR STRIP: 7 [PH] (ref 5–8)
PLATELET # BLD AUTO: 252 K/UL (ref 150–350)
PMV BLD AUTO: 11.7 FL (ref 9.2–12.9)
POTASSIUM SERPL-SCNC: 3.9 MMOL/L (ref 3.5–5.1)
PROT SERPL-MCNC: 7 G/DL (ref 6–8.4)
PROT UR QL STRIP: NEGATIVE
RBC # BLD AUTO: 4.18 M/UL (ref 4–5.4)
SODIUM SERPL-SCNC: 139 MMOL/L (ref 136–145)
SP GR UR STRIP: <=1.005 (ref 1–1.03)
TROPONIN I SERPL DL<=0.01 NG/ML-MCNC: 0.01 NG/ML (ref 0–0.03)
URN SPEC COLLECT METH UR: ABNORMAL
UROBILINOGEN UR STRIP-ACNC: NEGATIVE EU/DL
WBC # BLD AUTO: 9.84 K/UL (ref 3.9–12.7)

## 2020-08-03 PROCEDURE — 80053 COMPREHEN METABOLIC PANEL: CPT

## 2020-08-03 PROCEDURE — 96360 HYDRATION IV INFUSION INIT: CPT

## 2020-08-03 PROCEDURE — 93005 ELECTROCARDIOGRAM TRACING: CPT

## 2020-08-03 PROCEDURE — 85379 FIBRIN DEGRADATION QUANT: CPT

## 2020-08-03 PROCEDURE — 99285 EMERGENCY DEPT VISIT HI MDM: CPT | Mod: 25

## 2020-08-03 PROCEDURE — 85027 COMPLETE CBC AUTOMATED: CPT

## 2020-08-03 PROCEDURE — 81003 URINALYSIS AUTO W/O SCOPE: CPT

## 2020-08-03 PROCEDURE — 81025 URINE PREGNANCY TEST: CPT | Performed by: EMERGENCY MEDICINE

## 2020-08-03 PROCEDURE — 84484 ASSAY OF TROPONIN QUANT: CPT

## 2020-08-03 PROCEDURE — 25000003 PHARM REV CODE 250: Performed by: EMERGENCY MEDICINE

## 2020-08-03 RX ORDER — SODIUM CHLORIDE 9 MG/ML
1000 INJECTION, SOLUTION INTRAVENOUS
Status: COMPLETED | OUTPATIENT
Start: 2020-08-03 | End: 2020-08-03

## 2020-08-03 RX ADMIN — SODIUM CHLORIDE 1000 ML: 0.9 INJECTION, SOLUTION INTRAVENOUS at 04:08

## 2020-08-03 NOTE — ED PROVIDER NOTES
"Encounter Date: 8/3/2020    SCRIBE #1 NOTE: I, Kelsi Pavel, am scribing for, and in the presence of,  Dr. Quiroz. I have scribed the entire note.       History     Chief Complaint   Patient presents with    Arm Pain     Pt presents via [facility] ems stating she has "a shooting pain" beginning in her left hand that radiates to her left and right shoulder. Pt also reports "spasm" to her left leg     Casie Copeland is a 25 y.o. female who  has no past medical history on file.    The patient presents to the ED via EMS due to left arm pain. Pain and numbness are described as "shooting" and begins in her left hand radiating to both shoulders. Patient also notes pain behind the left knee. Patient notes feeling like there is a weight on her chest as well as some dizziness. Patient notes stabbing chest pain 1 week ago. Patient has history of anxiety and reports this incident is not reminiscent of previous anxiety attack. Patient does not have PCP that she sees regularly. Patient denies nausea, vomiting, fever, coughing or any known sick contacts. Patient reports having a recent negative Covid test. Of note, patient has Nexplanon implant in left arm placed by her OBGYN doctor.     The history is provided by the patient. No  was used.     Review of patient's allergies indicates:   Allergen Reactions    Clindamycin      No past medical history on file.  No past surgical history on file.  No family history on file.  Social History     Tobacco Use    Smoking status: Never Smoker   Substance Use Topics    Alcohol use: Not on file    Drug use: Not on file     Review of Systems   Constitutional: Negative for chills and fever.   HENT: Negative for sore throat.    Respiratory: Negative for cough and shortness of breath.    Cardiovascular: Positive for chest pain and palpitations.   Gastrointestinal: Negative for diarrhea, nausea and vomiting.   Genitourinary: Negative for dysuria, frequency and urgency. "   Musculoskeletal: Positive for myalgias. Negative for back pain.   Skin: Negative for rash and wound.   Neurological: Positive for dizziness and numbness. Negative for syncope and weakness.   Hematological: Does not bruise/bleed easily.   Psychiatric/Behavioral: Negative for agitation, behavioral problems and confusion.       Physical Exam     Initial Vitals   BP Pulse Resp Temp SpO2   08/03/20 0414 08/03/20 0353 -- 08/03/20 0353 08/03/20 0353   (!) 103/56 99  98 °F (36.7 °C) 100 %      MAP       --                Physical Exam    Nursing note and vitals reviewed.  Constitutional: She appears well-developed and well-nourished. She is not diaphoretic. No distress.   HENT:   Head: Normocephalic and atraumatic.   Mouth/Throat: Oropharynx is clear and moist.   Eyes: EOM are normal. Pupils are equal, round, and reactive to light.   Neck: No tracheal deviation present.   Cardiovascular: Normal rate, regular rhythm, normal heart sounds and intact distal pulses.   Pulmonary/Chest: Breath sounds normal. No stridor. No respiratory distress. She has no wheezes.   Abdominal: Soft. Bowel sounds are normal. She exhibits no distension and no mass. There is no abdominal tenderness.   Musculoskeletal: Normal range of motion. No edema.   Neurological: She is alert and oriented to person, place, and time. She has normal strength. No cranial nerve deficit or sensory deficit.   Skin: Skin is warm and dry. Capillary refill takes less than 2 seconds. No pallor.   Psychiatric: She has a normal mood and affect. Her behavior is normal. Thought content normal.         ED Course   Procedures  Labs Reviewed   COMPREHENSIVE METABOLIC PANEL - Abnormal; Notable for the following components:       Result Value    Anion Gap 7 (*)     All other components within normal limits   URINALYSIS, REFLEX TO URINE CULTURE - Abnormal; Notable for the following components:    Specific Gravity, UA <=1.005 (*)     All other components within normal limits     Narrative:     Specimen Source->Urine   CBC WITHOUT DIFFERENTIAL   TROPONIN I   D DIMER, QUANTITATIVE   POCT URINE PREGNANCY     EKG Readings: (Independently Interpreted)   Normal sinus rhythm, rate 92 bpm, No ST changes or ischemia, normal intervals.  No prior for comparison        Imaging Results          X-Ray Chest AP Portable (Final result)  Result time 08/03/20 05:12:48    Final result by Benjamin Owens MD (08/03/20 05:12:48)                 Impression:      No radiographic evidence of acute intrathoracic process on this single view.      Electronically signed by: Benjamin Owens MD  Date:    08/03/2020  Time:    05:12             Narrative:    EXAMINATION:  XR CHEST AP PORTABLE    CLINICAL HISTORY:  left sided chest pain;    TECHNIQUE:  Single frontal view of the chest was performed.    COMPARISON:  None    FINDINGS:  Cardiac monitoring leads overlie the chest.  Cardiomediastinal silhouette appears within normal limits.  Lungs are well expanded without evidence of confluent airspace consolidation.  No significant volume of pleural fluid or pneumothorax identified.  No evidence of pneumothorax.  Osseous structures appear grossly intact.                                 Medical Decision Making:   Clinical Tests:   Lab Tests: Ordered and Reviewed  Radiological Study: Reviewed and Ordered  Medical Tests: Ordered and Reviewed                      07:30 Care patient accepted from Dr. Quiroz at 6:00 a.m. shift change, awaiting lab results.  All labs unremarkable.  Patient reports resolution of her symptoms.  She will be discharged in stable condition to follow up with the primary physician as needed.  She may also return to the ED for any worsening or any other urgent concerns.   Dr. Eller           Clinical Impression:       ICD-10-CM ICD-9-CM   1. Paresthesias in left hand  R20.2 782.0   2. Left arm pain  M79.602 729.5   3. Atypical chest pain  R07.89 786.59   4. Palpitations  R00.2 785.1   5. Anxiety  F41.9  300.00   6. Dizziness  R42 780.4                   I, Dr. Ed Eastman, personally performed the services described in this documentation. All medical record entries made by the scribe were at my direction and in my presence. I have reviewed the chart and agree that the record reflects my personal performance and is accurate and complete. Ed Eastman MD.  7:30 AM 08/03/2020               Ed Eastman MD  08/03/20 0730

## 2020-08-03 NOTE — TELEPHONE ENCOUNTER
"Having a weird jolting feeling in left arm and across her shoulder for about 45 min. Was jolting in thigh also. Lift bundles of news paper. Having a pinching feeling in arm pit.    Reason for Disposition   [1] Similar pain previously AND [2] it was from "heart attack"    Protocols used: SHOULDER PAIN-A-AH      "

## 2020-09-23 ENCOUNTER — OFFICE VISIT (OUTPATIENT)
Dept: URGENT CARE | Facility: CLINIC | Age: 25
End: 2020-09-23
Payer: MEDICAID

## 2020-09-23 VITALS
BODY MASS INDEX: 23 KG/M2 | TEMPERATURE: 98 F | SYSTOLIC BLOOD PRESSURE: 118 MMHG | HEIGHT: 62 IN | OXYGEN SATURATION: 98 % | WEIGHT: 125 LBS | DIASTOLIC BLOOD PRESSURE: 60 MMHG | HEART RATE: 70 BPM | RESPIRATION RATE: 16 BRPM

## 2020-09-23 DIAGNOSIS — Z86.69 HX OF MIGRAINES: ICD-10-CM

## 2020-09-23 DIAGNOSIS — M79.622 LEFT AXILLARY PAIN: Primary | ICD-10-CM

## 2020-09-23 PROCEDURE — 99203 OFFICE O/P NEW LOW 30 MIN: CPT | Mod: S$GLB,,, | Performed by: PHYSICIAN ASSISTANT

## 2020-09-23 PROCEDURE — 99203 PR OFFICE/OUTPT VISIT, NEW, LEVL III, 30-44 MIN: ICD-10-PCS | Mod: S$GLB,,, | Performed by: PHYSICIAN ASSISTANT

## 2020-09-24 NOTE — PATIENT INSTRUCTIONS
- Rest.    - Drink plenty of fluids.    - Acetaminophen (tylenol) or Ibuprofen (advil,motrin) as directed as needed for fever/pain. Avoid tylenol if you have a history of liver disease. Do not take ibuprofen if you have a history of GI bleeding, kidney disease, or if you take blood thinners.   - take ibuprofen 600-800 mg every 6-8 hours for pain and inflammation.  You can also take Tylenol/acetaminophen 650-1000 mg every 6-8 hours for added pain relief.    - follow up with PCP for further evaluation/imaging.    - Follow up with your PCP or specialty clinic as directed in the next 1-2 weeks if not improved or as needed.  You can call [phone] to schedule an appointment with the appropriate provider.    - Go to the ER or seek medical attention immediately if you develop new or worsening symptoms.    - You must understand that you have received an Urgent Care treatment only and that you may be released before all of your medical problems are known or treated.   - You, the patient, will arrange for follow up care as instructed.   - If your condition worsens or fails to improve we recommend that you receive another evaluation at the ER immediately or contact your PCP to discuss your concerns or return here.       Fibrocystic Breast Changes (Presumed)  One or more lumps were found in your breasts. These are likely fibrocystic breast changes.   With this condition, fibrous tissue and small cysts form in your breasts. They may increase and decrease in size with your menstrual cycle. Symptoms can include breast lumps that you can see and feel. You may also have breast pain, swelling, and tenderness.  The lumps associated with fibrocystic breast changes are not cancer, and do not lead to cancer. They are very common, and affect most women at some point in their lives.  Treatment for fibrocystic breast changes is rarely needed. Home care to relieve symptoms may be recommended, though.  If confirmation of the diagnosis is  desired or needed, further evaluation may be done. This can include:  · Another exam by your healthcare provider or a gynecologist  · Imaging tests, such as a mammogram or ultrasound  · Biopsy (procedure to remove small tissue samples from the breast lumps)  Youll be told more about these tests, if needed.  Home care  · If you are having breast pain:  ¨ Take an over-the-counter pain reliever, if directed to by your provider.  ¨ Wear a well-fitted bra or sports bra for extra support. If you have breast pain at night, try wearing the bra during sleep.  ¨ Apply a warm compress (towel soaked in warm water) to the breast. You may also use a hot water bottle.  · Check your breasts each day. Keep a log of whether the lump seems to be changing in size or tenderness with your period. This can help your healthcare provider learn more about your breast condition.  · Ask your healthcare provider about lifestyle or diet changes or alternative treatments you can try to help treat this condition.  Follow-up care  Follow with your healthcare provider, or as directed. You may be advised to schedule another appointment for a breast exam with your provider or a gynecologist about 7 to 10 days after the start of your next period. If fibrocystic breast changes begin to cause symptoms that bother you, tell your provider. He or she may recommend other treatments.  When to seek medical advice  Call your healthcare provider right away if any of these occur:  · Fever of 100.4°F (38°C) or higher  · Redness or swelling of the breasts  · Discharge from the nipples  · Visible changes in the skin over the nipples or breasts  · Lumps grow larger, feel very hard, or have an irregular shape  · New lumps form that look or feel different from current lumps  Date Last Reviewed: 3/1/2017  © 1271-8372 [email or web address]. [address] [place] [address], [place] [address]. All rights reserved. This information is not intended as a substitute for  professional medical care. Always follow your healthcare professional's instructions.

## 2020-09-24 NOTE — PROGRESS NOTES
"Subjective:       Patient ID: Casie Copeland is a 25 y.o. female.    Vitals:  height is 5' 2" (1.575 m) and weight is 56.7 kg (125 lb). Her tympanic temperature is 98.1 °F (36.7 °C). Her blood pressure is 118/60 and her pulse is 70. Her respiration is 16 and oxygen saturation is 98%.     Chief Complaint: Arm Pain    Patient presents for pain of left axilla that has been present for the past month.  She states that pain will wax and wane in severity.  She states that she did notice a few days in the past month where she had no pain at all.  She states that pain seems to be worse with movement of the left shoulder.  She denies any radiation of pain.  She states that it is also tender to the touch.  Denies history of breast cancer.  Denies abnormal nipple discharge, retraction/inversion.  She denies any fever or other systemic symptoms.  Denies injury or trauma.  She does state that she does a lot of heavy lifting at work, but does not recall a specific injury.  Patient does note that 6 months ago she had a Nexplanon implanted in left upper arm.  Denies any numbness or tingling.  No rash or skin changes.  Of note, she was seen in the ER 8/3/2020 for chest pains, shooting pains in the arms; these symptoms have resolved.      Constitution: Negative for chills, fatigue and fever.   HENT: Negative for congestion and sore throat.    Neck: Negative for painful lymph nodes.   Cardiovascular: Negative for leg swelling.   Eyes: Negative for double vision and blurred vision.   Respiratory: Negative for cough and shortness of breath.    Gastrointestinal: Negative for nausea, vomiting and diarrhea.   Genitourinary: Negative for dysuria, frequency, urgency and history of kidney stones.   Musculoskeletal: Positive for pain. Negative for trauma, joint pain, joint swelling, abnormal ROM of joint, muscle cramps and muscle ache.   Skin: Negative for color change, pale, rash and bruising.   Allergic/Immunologic: Negative for " seasonal allergies.   Neurological: Negative for dizziness, history of vertigo, light-headedness, passing out and headaches.   Hematologic/Lymphatic: Negative for swollen lymph nodes.   Psychiatric/Behavioral: Negative for nervous/anxious, sleep disturbance and depression. The patient is not nervous/anxious.        Objective:      Physical Exam   Constitutional: She is oriented to person, place, and time. She appears well-developed. She is cooperative.  Non-toxic appearance. She does not appear ill. No distress.   HENT:   Head: Normocephalic and atraumatic.   Ears:   Right Ear: Hearing, tympanic membrane, external ear and ear canal normal.   Left Ear: Hearing, tympanic membrane, external ear and ear canal normal.   Nose: Nose normal. No mucosal edema, rhinorrhea or nasal deformity. No epistaxis. Right sinus exhibits no maxillary sinus tenderness and no frontal sinus tenderness. Left sinus exhibits no maxillary sinus tenderness and no frontal sinus tenderness.   Mouth/Throat: Uvula is midline, oropharynx is clear and moist and mucous membranes are normal. No trismus in the jaw. Normal dentition. No uvula swelling. No oropharyngeal exudate, posterior oropharyngeal edema or posterior oropharyngeal erythema.   Eyes: Conjunctivae and lids are normal. Right eye exhibits no discharge. Left eye exhibits no discharge. No scleral icterus.   Neck: Trachea normal, normal range of motion, full passive range of motion without pain and phonation normal. Neck supple. No neck rigidity. No edema and no erythema present.   Cardiovascular: Normal rate, regular rhythm, normal heart sounds and normal pulses.   Pulmonary/Chest: Effort normal and breath sounds normal. No respiratory distress. She has no decreased breath sounds. She has no rhonchi.       Abdominal: Soft. Normal appearance and bowel sounds are normal. She exhibits no distension, no pulsatile midline mass and no mass. There is no abdominal tenderness.   Musculoskeletal:  Normal range of motion.         General: No deformity.      Right lower leg: No edema.      Left lower leg: No edema.   Neurological: She is alert and oriented to person, place, and time. She exhibits normal muscle tone. Coordination normal.   Skin: Skin is warm, dry, intact, not diaphoretic and not pale. Psychiatric: Her speech is normal and behavior is normal. Judgment and thought content normal.   Nursing note and vitals reviewed.    Area of pain is reproducible, very tender to palpation.  I do feel a1.5 cm mobile mass in the area which seems to be what is most tender to palpation.  Discussed top differential diagnosis including fibrocystic change.  Also other differentials include chest wall strain, lymphadenopathy.  Instructed NSAIDs, ice/heat.  Instructed close follow-up with PCP or OBGYN for further evaluation.  If persists, would recommend soft tissue imaging such as ultrasound or MRI.  Discussed ER/follow-up precautions.  Patient and mother expressed understanding and agree with plan.  Patient also is requesting a a neurology referral for chronic migraines.  She and mother both admit that [facility] stopped taking their Medicaid.  She tried to get a PCP appointment but was not able to.  I have given resources for clinics around the area that accept Medicaid.  Assessment:       1. Left axillary pain    2. Hx of migraines        Plan:         Left axillary pain    Hx of migraines  -     Ambulatory referral/consult to Neurology      Patient Instructions   - Rest.    - Drink plenty of fluids.    - Acetaminophen (tylenol) or Ibuprofen (advil,motrin) as directed as needed for fever/pain. Avoid tylenol if you have a history of liver disease. Do not take ibuprofen if you have a history of GI bleeding, kidney disease, or if you take blood thinners.   - take ibuprofen 600-800 mg every 6-8 hours for pain and inflammation.  You can also take Tylenol/acetaminophen 650-1000 mg every 6-8 hours for added pain relief.    -  follow up with PCP for further evaluation/imaging.    - Follow up with your PCP or specialty clinic as directed in the next 1-2 weeks if not improved or as needed.  You can call [phone] to schedule an appointment with the appropriate provider.    - Go to the ER or seek medical attention immediately if you develop new or worsening symptoms.    - You must understand that you have received an Urgent Care treatment only and that you may be released before all of your medical problems are known or treated.   - You, the patient, will arrange for follow up care as instructed.   - If your condition worsens or fails to improve we recommend that you receive another evaluation at the ER immediately or contact your PCP to discuss your concerns or return here.       Fibrocystic Breast Changes (Presumed)  One or more lumps were found in your breasts. These are likely fibrocystic breast changes.   With this condition, fibrous tissue and small cysts form in your breasts. They may increase and decrease in size with your menstrual cycle. Symptoms can include breast lumps that you can see and feel. You may also have breast pain, swelling, and tenderness.  The lumps associated with fibrocystic breast changes are not cancer, and do not lead to cancer. They are very common, and affect most women at some point in their lives.  Treatment for fibrocystic breast changes is rarely needed. Home care to relieve symptoms may be recommended, though.  If confirmation of the diagnosis is desired or needed, further evaluation may be done. This can include:  · Another exam by your healthcare provider or a gynecologist  · Imaging tests, such as a mammogram or ultrasound  · Biopsy (procedure to remove small tissue samples from the breast lumps)  Youll be told more about these tests, if needed.  Home care  · If you are having breast pain:  ¨ Take an over-the-counter pain reliever, if directed to by your provider.  ¨ Wear a well-fitted bra or  sports bra for extra support. If you have breast pain at night, try wearing the bra during sleep.  ¨ Apply a warm compress (towel soaked in warm water) to the breast. You may also use a hot water bottle.  · Check your breasts each day. Keep a log of whether the lump seems to be changing in size or tenderness with your period. This can help your healthcare provider learn more about your breast condition.  · Ask your healthcare provider about lifestyle or diet changes or alternative treatments you can try to help treat this condition.  Follow-up care  Follow with your healthcare provider, or as directed. You may be advised to schedule another appointment for a breast exam with your provider or a gynecologist about 7 to 10 days after the start of your next period. If fibrocystic breast changes begin to cause symptoms that bother you, tell your provider. He or she may recommend other treatments.  When to seek medical advice  Call your healthcare provider right away if any of these occur:  · Fever of 100.4°F (38°C) or higher  · Redness or swelling of the breasts  · Discharge from the nipples  · Visible changes in the skin over the nipples or breasts  · Lumps grow larger, feel very hard, or have an irregular shape  · New lumps form that look or feel different from current lumps  Date Last Reviewed: 3/1/2017  © 4056-2443 [employer]. [address] [place] [address], [place] [address]. All rights reserved. This information is not intended as a substitute for professional medical care. Always follow your healthcare professional's instructions.

## 2020-10-13 ENCOUNTER — OFFICE VISIT (OUTPATIENT)
Dept: OBSTETRICS AND GYNECOLOGY | Facility: CLINIC | Age: 25
End: 2020-10-13
Payer: MEDICAID

## 2020-10-13 VITALS
HEIGHT: 62 IN | WEIGHT: 118.38 LBS | SYSTOLIC BLOOD PRESSURE: 114 MMHG | DIASTOLIC BLOOD PRESSURE: 66 MMHG | BODY MASS INDEX: 21.79 KG/M2

## 2020-10-13 DIAGNOSIS — N64.4 BREAST PAIN: ICD-10-CM

## 2020-10-13 DIAGNOSIS — Z11.3 ROUTINE SCREENING FOR STI (SEXUALLY TRANSMITTED INFECTION): ICD-10-CM

## 2020-10-13 DIAGNOSIS — Z01.419 ENCOUNTER FOR ANNUAL ROUTINE GYNECOLOGICAL EXAMINATION: Primary | ICD-10-CM

## 2020-10-13 PROBLEM — G43.009 MIGRAINE WITHOUT AURA: Status: ACTIVE | Noted: 2020-10-13

## 2020-10-13 PROBLEM — F41.9 ANXIETY DISORDER, UNSPECIFIED: Status: ACTIVE | Noted: 2020-03-25

## 2020-10-13 LAB
B-HCG UR QL: NEGATIVE
CTP QC/QA: YES

## 2020-10-13 PROCEDURE — 99395 PREV VISIT EST AGE 18-39: CPT | Mod: S$PBB,,, | Performed by: OBSTETRICS & GYNECOLOGY

## 2020-10-13 PROCEDURE — 87491 CHLMYD TRACH DNA AMP PROBE: CPT

## 2020-10-13 PROCEDURE — 99999 PR PBB SHADOW E&M-EST. PATIENT-LVL III: CPT | Mod: PBBFAC,,, | Performed by: OBSTETRICS & GYNECOLOGY

## 2020-10-13 PROCEDURE — 99395 PR PREVENTIVE VISIT,EST,18-39: ICD-10-PCS | Mod: S$PBB,,, | Performed by: OBSTETRICS & GYNECOLOGY

## 2020-10-13 PROCEDURE — 99999 PR PBB SHADOW E&M-EST. PATIENT-LVL III: ICD-10-PCS | Mod: PBBFAC,,, | Performed by: OBSTETRICS & GYNECOLOGY

## 2020-10-13 PROCEDURE — 99213 OFFICE O/P EST LOW 20 MIN: CPT | Mod: PBBFAC,PO | Performed by: OBSTETRICS & GYNECOLOGY

## 2020-10-13 PROCEDURE — 88175 CYTOPATH C/V AUTO FLUID REDO: CPT

## 2020-10-13 NOTE — PATIENT INSTRUCTIONS
Breast Health: Breast Self-Awareness  What is breast self-awareness?  Breast self-awareness is knowing how your breasts normally look and feel. Your breasts change as you go through different stages of your life. So its important to learn what is normal for your breasts. Breast self-awareness helps you notice any changes in your breasts right away. Report any changes to your healthcare provider.  Why is breast self-awareness important?  Many experts now say that women should focus on breast self-awareness instead of doing a breast self-examination (BSE). These experts include the American Cancer Society, the U.S. Preventive Services Task Force, and the American Congress of Obstetricians and Gynecologists. Some experts even advise not teaching women to do a BSE. Thats because research hasnt shown a clear benefit to doing BSEs.  Breast self-awareness is different than a BSE. Breast self-awareness isnt about following a certain method and schedule. Its about knowing what's normal for your breasts. That way you can notice even small changes right away. If you see any changes, report them to your healthcare provider.  Changes to look for  Call your healthcare provider if you find any changes in your breasts that concern you. These changes may include:  · A lump  · Nipple discharge other than breast milk, especially a bloody discharge  · Swelling  · A change in size or shape  · Skin irritation, such as redness, thickening, or dimpling of the skin  · Swollen lymph nodes in the armpit  · Nipple problems, such as pain or redness  If you find a lump  Contact your provider if you find lumpiness in one breast, feel something different in the tissue, or feel a definite lump. Sometimes lumpiness may be due to menstrual changes. But there may be reason for concern.  Your provider may want to see you right away if you have:  · Nipple discharge that is bloody  · Skin changes on your breast, such as dimpling or puckering  Its  normal to be upset if you find a lump. But its important to contact your provider right away. Remember that most breast lumps are benign. This means they are not cancer.  Date Last Reviewed: 8/10/2015  © 2416-0454 [employer]. [address] [place] [address]. All rights reserved. This information is not intended as a substitute for professional medical care. Always follow your healthcare professional's instructions.        The Range of Pap Test Results  When your Pap test is sent to the lab, the lab studies your cell samples and reports any abnormal cell changes. Your health care provider can discuss these changes with you. In some cases, an abnormal Pap test is due to an infection. More serious cell changes range from dysplasia to cancer. Talk to your health care provider about your Pap test.    Normal results  Cervical cells, even normal ones, are always changing. As they mature, normal squamous cells move from deeper layers within the cervix. Over time, these cells flatten and cover the surface of the cervix. Within the cervical canal, the cells are different. These glandular cells are taller and not as flat as the cells on the surface of the cervix. When a Pap test sample shows healthy cells of both types, the results are negative. Keep having Pap tests as often as directed.  Abnormal results  A positive Pap test result means some cells in the sample showed abnormal changes. These results are grouped by the type of cell change and the location, or extent, of the changes. Depending on the results, you may need further testing.  · Inflammation: Noncancerous changes are present. They may be due to normal cell repair. Or, they may be caused by an infection, such as HPV or yeast. Further testing may be needed. (Also called reactive cellular changes.)  · Atypical squamous cells: Test results are unclear. Cells on the surface of the cervix show changes, but their significance is not yet known.  Testing for HPV and other sexually transmitted infections (STIs) may be needed. Treatment may be required. (Reported as ASC-US or ASC-H.)  · Atypical glandular cells: Cells lining the cervical canal show abnormal changes. Further testing is likely. You may also have treatment to destroy or remove problem cells. (Reported as AGC.)  · Mild dysplasia: Cells show distinct changes. More testing or HPV typing may be done. You may also have treatment to destroy or remove problem cells. (Reported as low-grade NEERAJ or CALEB 1.)  · Moderate to severe dysplasia: Cells show precancerous changes. Or, noninvasive cancer (carcinoma in situ) may be present. Treatment to destroy or remove problem cells is likely. (Reported as high-grade NEERAJ or CALEB 2 or CALEB 3.)  · Cancer: Different types of cancer may be detected by your Pap test. More tests to assess the cancer's extent are likely. The type of treatment will depend on the test results and other factors, such as age and health history. (Reported as squamous cell carcinoma, endocervical adenocarcinoma in situ, or adenocarcinoma.)  Date Last Reviewed: 5/12/2015  © 6456-8601 [employer]. [address] [place] [address]. All rights reserved. This information is not intended as a substitute for professional medical care. Always follow your healthcare professional's instructions.

## 2020-10-13 NOTE — PROGRESS NOTES
"  Chief Complaint: Well Woman Exam     HPI:      Casie Copeland is a 25 y.o.  who presents for annual exam. She is currently complaining of bilateral breast pain that has been present for approximately 6 weeks.  Patient presented to urgent care a few weeks ago and patient was informed that she may have a cyst.  Denies nipple discharge. Denies nausea or emesis. Reports past history of multiple Monster drinks per day.  But has not been consuming more than a 1/2 of Monster per day for the past month.     Ms. Copeland is currently sexually active with a single male partner. She would like STD screening today. Patient does not have regular monthly menses. No LMP recorded. Patient has had an implant. She is currently using Nexplanon inserted in 3/2020 for contraception.    Previous Pap: (No result found)    Gardasil: has not had    OB History        0    Para   0    Term   0       0    AB   0    Living   0       SAB   0    TAB   0    Ectopic   0    Multiple   0    Live Births   0           Obstetric Comments   Menarche 13  Hx chlamydia             ROS:     Review of Systems   Constitutional: Negative for activity change, appetite change and fatigue.   Respiratory: Negative for shortness of breath.    Cardiovascular: Negative for chest pain.   Gastrointestinal: Negative for abdominal pain, constipation and diarrhea.   Endocrine: Negative for cold intolerance and heat intolerance.   Genitourinary: Negative for dysuria, frequency, menstrual irregularity, pelvic pain and vaginal discharge.   Integumentary:  Positive for breast tenderness. Negative for breast mass and breast discharge.   Psychiatric/Behavioral: Negative for dysphoric mood. The patient is not nervous/anxious.    Breast: Positive for tenderness.Negative for mass      Physical Exam:      PHYSICAL EXAM:  /66   Ht 5' 2" (1.575 m)   Wt 53.7 kg (118 lb 6.4 oz)   BMI 21.66 kg/m²   Body mass index is 21.66 kg/m².     APPEARANCE: Well " nourished, well developed, in no acute distress.  PSYCH: Appropriate mood and affect.  SKIN: No acne or hirsutism  NECK: Neck symmetric without masses or thyromegaly  NODES: No inguinal, axillary, or supraclavicular lymph node enlargement  CHEST: Normal respiratory effort.  ABDOMEN: Soft.  No tenderness or masses.   BREASTS: Symmetrical, no skin changes or visible lesions.  No palpable masses or nipple discharge bilaterally.  PELVIC: Normal external genitalia without lesions.  Normal hair distribution.  Adequate perineal body, normal urethral meatus.  Vagina moist and well rugated without lesions or discharge.  Cervix pink, without lesions, discharge or tenderness.  No significant cystocele or rectocele.  Bimanual exam shows uterus to be normal size, regular, mobile and nontender.  Adnexa without masses or tenderness.    EXTREMITIES: No edema.    Results for orders placed or performed in visit on 10/13/20   POCT urine pregnancy   Result Value Ref Range    POC Preg Test, Ur Negative Negative     Acceptable Yes          Assessment:     1. Encounter for annual routine gynecological examination  Liquid-Based Pap Smear, Screening   2. Breast pain  US Breast Right Complete    POCT urine pregnancy   3. Routine screening for STI (sexually transmitted infection)  C. trachomatis/N. gonorrhoeae by AMP DNA [facility]; Urine    Vaginosis Screen by DNA Probe         Plan:     1. Clinical breast exam performed.  2. Pap today.  3. Breast ultrasound ordered.  4. STI screening - GC/CT, Affirm.  5. Recommend Gardasil, rx given.   6. Follow up in about 1 year (around 10/13/2021) for annual well woman exam or as needed.    Counseling:     Patient was counseled today on current ASCCP pap guidelines, the recommendation for yearly pelvic exams, healthy diet and exercise routines, breast self awareness. She is to see her PCP for other health maintenance.       Use of the [facility] Patient Portal discussed and encouraged during  today's visit.       Claire Johnson MD

## 2020-10-14 ENCOUNTER — HOSPITAL ENCOUNTER (OUTPATIENT)
Dept: RADIOLOGY | Facility: HOSPITAL | Age: 25
Discharge: HOME | End: 2020-10-14
Attending: OBSTETRICS & GYNECOLOGY
Payer: MEDICAID

## 2020-10-14 DIAGNOSIS — N64.4 BREAST PAIN: ICD-10-CM

## 2020-10-14 PROCEDURE — 76642 US BREAST BILATERAL LIMITED: ICD-10-PCS | Mod: 26,50,, | Performed by: RADIOLOGY

## 2020-10-14 PROCEDURE — 76642 ULTRASOUND BREAST LIMITED: CPT | Mod: TC,RT

## 2020-10-14 PROCEDURE — 76642 ULTRASOUND BREAST LIMITED: CPT | Mod: 26,50,, | Performed by: RADIOLOGY

## 2020-10-14 PROCEDURE — 76642 ULTRASOUND BREAST LIMITED: CPT | Mod: TC,50

## 2020-10-15 LAB
CANDIDA RRNA VAG QL PROBE: NEGATIVE
G VAGINALIS RRNA GENITAL QL PROBE: POSITIVE
T VAGINALIS RRNA GENITAL QL PROBE: NEGATIVE

## 2020-10-19 DIAGNOSIS — B96.89 BACTERIAL VAGINOSIS: Primary | ICD-10-CM

## 2020-10-19 DIAGNOSIS — N76.0 BACTERIAL VAGINOSIS: Primary | ICD-10-CM

## 2020-10-19 RX ORDER — METRONIDAZOLE 65 MG/5G
1 GEL TOPICAL ONCE
Qty: 5 G | Refills: 0 | Status: SHIPPED | OUTPATIENT
Start: 2020-10-19 | End: 2020-10-19

## 2020-11-10 LAB
FINAL PATHOLOGIC DIAGNOSIS: NORMAL
Lab: NORMAL

## 2020-12-20 ENCOUNTER — CLINICAL SUPPORT (OUTPATIENT)
Dept: URGENT CARE | Facility: CLINIC | Age: 25
End: 2020-12-20
Payer: MEDICAID

## 2020-12-20 DIAGNOSIS — Z20.822 ENCOUNTER FOR LABORATORY TESTING FOR COVID-19 VIRUS: Primary | ICD-10-CM

## 2020-12-20 LAB
CTP QC/QA: YES
SARS-COV-2 RDRP RESP QL NAA+PROBE: NEGATIVE

## 2020-12-20 PROCEDURE — U0002 COVID-19 LAB TEST NON-CDC: HCPCS | Mod: QW,S$GLB,, | Performed by: FAMILY MEDICINE

## 2020-12-20 PROCEDURE — U0002: ICD-10-PCS | Mod: QW,S$GLB,, | Performed by: FAMILY MEDICINE

## 2021-01-10 ENCOUNTER — CLINICAL SUPPORT (OUTPATIENT)
Dept: URGENT CARE | Facility: CLINIC | Age: 26
End: 2021-01-10
Payer: MEDICAID

## 2021-01-10 DIAGNOSIS — Z78.9 NO KNOWN HEALTH PROBLEMS: Primary | ICD-10-CM

## 2021-01-10 LAB
CTP QC/QA: YES
SARS-COV-2 RDRP RESP QL NAA+PROBE: NEGATIVE

## 2021-01-10 PROCEDURE — 99211 PR OFFICE/OUTPT VISIT, EST, LEVL I: ICD-10-PCS | Mod: S$GLB,,, | Performed by: PHYSICIAN ASSISTANT

## 2021-01-10 PROCEDURE — 87635: ICD-10-PCS | Mod: QW,S$GLB,, | Performed by: PHYSICIAN ASSISTANT

## 2021-01-10 PROCEDURE — 99211 OFF/OP EST MAY X REQ PHY/QHP: CPT | Mod: S$GLB,,, | Performed by: PHYSICIAN ASSISTANT

## 2021-01-10 PROCEDURE — 87635 SARS-COV-2 COVID-19 AMP PRB: CPT | Mod: QW,S$GLB,, | Performed by: PHYSICIAN ASSISTANT

## 2021-03-02 ENCOUNTER — TELEPHONE (OUTPATIENT)
Dept: NEUROLOGY | Facility: CLINIC | Age: 26
End: 2021-03-02

## 2021-03-28 ENCOUNTER — CLINICAL SUPPORT (OUTPATIENT)
Dept: URGENT CARE | Facility: CLINIC | Age: 26
End: 2021-03-28
Payer: MEDICAID

## 2021-03-28 VITALS
BODY MASS INDEX: 28.13 KG/M2 | RESPIRATION RATE: 18 BRPM | OXYGEN SATURATION: 98 % | HEART RATE: 101 BPM | WEIGHT: 134 LBS | HEIGHT: 58 IN | SYSTOLIC BLOOD PRESSURE: 121 MMHG | DIASTOLIC BLOOD PRESSURE: 72 MMHG | TEMPERATURE: 99 F

## 2021-03-28 DIAGNOSIS — R30.0 DYSURIA: ICD-10-CM

## 2021-03-28 DIAGNOSIS — N30.00 ACUTE CYSTITIS WITHOUT HEMATURIA: Primary | ICD-10-CM

## 2021-03-28 LAB
B-HCG UR QL: NEGATIVE
BILIRUB UR QL STRIP: POSITIVE
CTP QC/QA: YES
GLUCOSE UR QL STRIP: NEGATIVE
KETONES UR QL STRIP: NEGATIVE
LEUKOCYTE ESTERASE UR QL STRIP: POSITIVE
PH, POC UA: 5.5 (ref 5–8)
POC BLOOD, URINE: NEGATIVE
POC NITRATES, URINE: NEGATIVE
PROT UR QL STRIP: NEGATIVE
SP GR UR STRIP: 1.03 (ref 1–1.03)
UROBILINOGEN UR STRIP-ACNC: NORMAL (ref 0.1–1.1)

## 2021-03-28 PROCEDURE — 99214 OFFICE O/P EST MOD 30 MIN: CPT | Mod: 25,S$GLB,, | Performed by: NURSE PRACTITIONER

## 2021-03-28 PROCEDURE — 99214 PR OFFICE/OUTPT VISIT, EST, LEVL IV, 30-39 MIN: ICD-10-PCS | Mod: 25,S$GLB,, | Performed by: NURSE PRACTITIONER

## 2021-03-28 PROCEDURE — 81025 POCT URINE PREGNANCY: ICD-10-PCS | Mod: S$GLB,,, | Performed by: NURSE PRACTITIONER

## 2021-03-28 PROCEDURE — 87086 URINE CULTURE/COLONY COUNT: CPT | Performed by: NURSE PRACTITIONER

## 2021-03-28 PROCEDURE — 81003 POCT URINALYSIS, DIPSTICK, AUTOMATED, W/O SCOPE: ICD-10-PCS | Mod: QW,S$GLB,, | Performed by: NURSE PRACTITIONER

## 2021-03-28 PROCEDURE — 81003 URINALYSIS AUTO W/O SCOPE: CPT | Mod: QW,S$GLB,, | Performed by: NURSE PRACTITIONER

## 2021-03-28 PROCEDURE — 81025 URINE PREGNANCY TEST: CPT | Mod: S$GLB,,, | Performed by: NURSE PRACTITIONER

## 2021-03-28 RX ORDER — NITROFURANTOIN 25; 75 MG/1; MG/1
100 CAPSULE ORAL 2 TIMES DAILY
Qty: 10 CAPSULE | Refills: 0 | Status: SHIPPED | OUTPATIENT
Start: 2021-03-28 | End: 2021-04-02

## 2021-03-29 ENCOUNTER — OFFICE VISIT (OUTPATIENT)
Dept: NEUROLOGY | Facility: CLINIC | Age: 26
End: 2021-03-29
Payer: MEDICAID

## 2021-03-29 ENCOUNTER — PATIENT MESSAGE (OUTPATIENT)
Dept: NEUROLOGY | Facility: CLINIC | Age: 26
End: 2021-03-29

## 2021-03-29 DIAGNOSIS — G43.009 MIGRAINE WITHOUT AURA AND WITHOUT STATUS MIGRAINOSUS, NOT INTRACTABLE: Primary | ICD-10-CM

## 2021-03-29 PROCEDURE — 99215 OFFICE O/P EST HI 40 MIN: CPT | Mod: 95,,, | Performed by: PHYSICIAN ASSISTANT

## 2021-03-29 PROCEDURE — 99215 PR OFFICE/OUTPT VISIT, EST, LEVL V, 40-54 MIN: ICD-10-PCS | Mod: 95,,, | Performed by: PHYSICIAN ASSISTANT

## 2021-03-29 RX ORDER — RIZATRIPTAN BENZOATE 5 MG/1
TABLET, ORALLY DISINTEGRATING ORAL
Qty: 12 TABLET | Refills: 3 | Status: SHIPPED | OUTPATIENT
Start: 2021-03-29 | End: 2021-05-10

## 2021-03-29 RX ORDER — AMITRIPTYLINE HYDROCHLORIDE 25 MG/1
25 TABLET, FILM COATED ORAL NIGHTLY
Qty: 30 TABLET | Refills: 2 | Status: SHIPPED | OUTPATIENT
Start: 2021-03-29 | End: 2021-05-10

## 2021-03-30 LAB
BACTERIA UR CULT: NORMAL
BACTERIA UR CULT: NORMAL

## 2021-03-31 ENCOUNTER — TELEPHONE (OUTPATIENT)
Dept: URGENT CARE | Facility: CLINIC | Age: 26
End: 2021-03-31

## 2021-05-04 ENCOUNTER — PATIENT MESSAGE (OUTPATIENT)
Dept: RESEARCH | Facility: HOSPITAL | Age: 26
End: 2021-05-04

## 2021-05-10 ENCOUNTER — PATIENT MESSAGE (OUTPATIENT)
Dept: NEUROLOGY | Facility: CLINIC | Age: 26
End: 2021-05-10

## 2021-05-10 ENCOUNTER — OFFICE VISIT (OUTPATIENT)
Dept: NEUROLOGY | Facility: CLINIC | Age: 26
End: 2021-05-10
Payer: MEDICAID

## 2021-05-10 DIAGNOSIS — G43.009 MIGRAINE WITHOUT AURA AND WITHOUT STATUS MIGRAINOSUS, NOT INTRACTABLE: Primary | ICD-10-CM

## 2021-05-10 PROCEDURE — 99214 OFFICE O/P EST MOD 30 MIN: CPT | Mod: 95,,, | Performed by: PHYSICIAN ASSISTANT

## 2021-05-10 PROCEDURE — 99214 PR OFFICE/OUTPT VISIT, EST, LEVL IV, 30-39 MIN: ICD-10-PCS | Mod: 95,,, | Performed by: PHYSICIAN ASSISTANT

## 2021-05-10 RX ORDER — AMITRIPTYLINE HYDROCHLORIDE 50 MG/1
50 TABLET, FILM COATED ORAL NIGHTLY
Qty: 30 TABLET | Refills: 2 | Status: SHIPPED | OUTPATIENT
Start: 2021-05-10 | End: 2021-08-04

## 2021-05-10 RX ORDER — RIZATRIPTAN BENZOATE 10 MG/1
TABLET, ORALLY DISINTEGRATING ORAL
Qty: 12 TABLET | Refills: 2 | Status: SHIPPED | OUTPATIENT
Start: 2021-05-10 | End: 2021-07-26

## 2021-06-09 ENCOUNTER — OFFICE VISIT (OUTPATIENT)
Dept: FAMILY MEDICINE | Facility: CLINIC | Age: 26
End: 2021-06-09
Payer: COMMERCIAL

## 2021-06-09 VITALS
DIASTOLIC BLOOD PRESSURE: 80 MMHG | OXYGEN SATURATION: 98 % | SYSTOLIC BLOOD PRESSURE: 116 MMHG | HEART RATE: 111 BPM | HEIGHT: 59 IN | BODY MASS INDEX: 31.71 KG/M2 | WEIGHT: 157.31 LBS | TEMPERATURE: 98 F | RESPIRATION RATE: 18 BRPM

## 2021-06-09 DIAGNOSIS — F32.A ANXIETY AND DEPRESSION: ICD-10-CM

## 2021-06-09 DIAGNOSIS — Z11.59 ENCOUNTER FOR HEPATITIS C SCREENING TEST FOR LOW RISK PATIENT: ICD-10-CM

## 2021-06-09 DIAGNOSIS — F90.9 ATTENTION DEFICIT HYPERACTIVITY DISORDER (ADHD), UNSPECIFIED ADHD TYPE: ICD-10-CM

## 2021-06-09 DIAGNOSIS — G43.009 MIGRAINE WITHOUT AURA AND WITHOUT STATUS MIGRAINOSUS, NOT INTRACTABLE: ICD-10-CM

## 2021-06-09 DIAGNOSIS — F41.9 ANXIETY AND DEPRESSION: ICD-10-CM

## 2021-06-09 DIAGNOSIS — R00.0 TACHYCARDIA: ICD-10-CM

## 2021-06-09 DIAGNOSIS — Z11.4 SCREENING FOR HIV WITHOUT PRESENCE OF RISK FACTORS: ICD-10-CM

## 2021-06-09 DIAGNOSIS — Z00.00 ENCOUNTER FOR BLOOD TEST FOR ROUTINE GENERAL PHYSICAL EXAMINATION: ICD-10-CM
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
## 2021-06-09 DIAGNOSIS — R09.82 POSTNASAL DRIP: ICD-10-CM

## 2021-06-09 DIAGNOSIS — S80.861A INSECT BITE OF RIGHT LOWER LEG, INITIAL ENCOUNTER: Primary | ICD-10-CM

## 2021-06-09 DIAGNOSIS — Z13.1 DIABETES MELLITUS SCREENING: ICD-10-CM

## 2021-06-09 DIAGNOSIS — Z13.29 THYROID DISORDER SCREEN: ICD-10-CM

## 2021-06-09 DIAGNOSIS — Z13.0 SCREENING FOR DEFICIENCY ANEMIA: ICD-10-CM

## 2021-06-09 DIAGNOSIS — Z13.220 SCREENING CHOLESTEROL LEVEL: ICD-10-CM

## 2021-06-09 PROCEDURE — 1126F AMNT PAIN NOTED NONE PRSNT: CPT | Mod: S$GLB,,, | Performed by: NURSE PRACTITIONER

## 2021-06-09 PROCEDURE — 99999 PR PBB SHADOW E&M-EST. PATIENT-LVL V: ICD-10-PCS | Mod: PBBFAC,,, | Performed by: NURSE PRACTITIONER

## 2021-06-09 PROCEDURE — 3008F PR BODY MASS INDEX (BMI) DOCUMENTED: ICD-10-PCS | Mod: CPTII,S$GLB,, | Performed by: NURSE PRACTITIONER

## 2021-06-09 PROCEDURE — 93005 EKG 12-LEAD: ICD-10-PCS | Mod: S$GLB,,, | Performed by: NURSE PRACTITIONER

## 2021-06-09 PROCEDURE — 93010 ELECTROCARDIOGRAM REPORT: CPT | Mod: S$GLB,,, | Performed by: INTERNAL MEDICINE

## 2021-06-09 PROCEDURE — 93005 ELECTROCARDIOGRAM TRACING: CPT | Mod: S$GLB,,, | Performed by: NURSE PRACTITIONER

## 2021-06-09 PROCEDURE — 3008F BODY MASS INDEX DOCD: CPT | Mod: CPTII,S$GLB,, | Performed by: NURSE PRACTITIONER

## 2021-06-09 PROCEDURE — 1126F PR PAIN SEVERITY QUANTIFIED, NO PAIN PRESENT: ICD-10-PCS | Mod: S$GLB,,, | Performed by: NURSE PRACTITIONER

## 2021-06-09 PROCEDURE — 93010 EKG 12-LEAD: ICD-10-PCS | Mod: S$GLB,,, | Performed by: INTERNAL MEDICINE

## 2021-06-09 PROCEDURE — 99999 PR PBB SHADOW E&M-EST. PATIENT-LVL V: CPT | Mod: PBBFAC,,, | Performed by: NURSE PRACTITIONER

## 2021-06-09 PROCEDURE — 99204 PR OFFICE/OUTPT VISIT, NEW, LEVL IV, 45-59 MIN: ICD-10-PCS | Mod: S$GLB,,, | Performed by: NURSE PRACTITIONER

## 2021-06-09 PROCEDURE — 99204 OFFICE O/P NEW MOD 45 MIN: CPT | Mod: S$GLB,,, | Performed by: NURSE PRACTITIONER

## 2021-06-09 RX ORDER — FLUTICASONE PROPIONATE 50 MCG
2 SPRAY, SUSPENSION (ML) NASAL DAILY
Qty: 16 G | Refills: 5 | Status: SHIPPED | OUTPATIENT
Start: 2021-06-09

## 2021-06-09 RX ORDER — TRIAMCINOLONE ACETONIDE 5 MG/G
CREAM TOPICAL 2 TIMES DAILY
Qty: 15 G | Refills: 0 | Status: SHIPPED | OUTPATIENT
Start: 2021-06-09 | End: 2021-07-06 | Stop reason: ALTCHOICE

## 2021-06-10 ENCOUNTER — PATIENT MESSAGE (OUTPATIENT)
Dept: FAMILY MEDICINE | Facility: CLINIC | Age: 26
End: 2021-06-10

## 2021-06-10 ENCOUNTER — TELEPHONE (OUTPATIENT)
Dept: FAMILY MEDICINE | Facility: CLINIC | Age: 26
End: 2021-06-10

## 2021-06-15 ENCOUNTER — IMMUNIZATION (OUTPATIENT)
Dept: FAMILY MEDICINE | Facility: CLINIC | Age: 26
End: 2021-06-15
Payer: COMMERCIAL

## 2021-06-15 DIAGNOSIS — Z23 NEED FOR VACCINATION: Primary | ICD-10-CM

## 2021-06-15 PROCEDURE — 91300 COVID-19, MRNA, LNP-S, PF, 30 MCG/0.3 ML DOSE VACCINE: CPT | Mod: PBBFAC,PN

## 2021-06-21 ENCOUNTER — OFFICE VISIT (OUTPATIENT)
Dept: PSYCHIATRY | Facility: CLINIC | Age: 26
End: 2021-06-21
Payer: COMMERCIAL

## 2021-06-21 VITALS
HEART RATE: 100 BPM | DIASTOLIC BLOOD PRESSURE: 77 MMHG | OXYGEN SATURATION: 99 % | TEMPERATURE: 99 F | RESPIRATION RATE: 16 BRPM | HEIGHT: 59 IN | BODY MASS INDEX: 31.85 KG/M2 | SYSTOLIC BLOOD PRESSURE: 116 MMHG | WEIGHT: 158 LBS

## 2021-06-21 DIAGNOSIS — F41.1 GENERALIZED ANXIETY DISORDER: ICD-10-CM

## 2021-06-21 DIAGNOSIS — F33.1 MDD (MAJOR DEPRESSIVE DISORDER), RECURRENT EPISODE, MODERATE: Primary | ICD-10-CM

## 2021-06-21 DIAGNOSIS — F41.9 ANXIETY AND DEPRESSION: ICD-10-CM

## 2021-06-21 DIAGNOSIS — F90.9 ATTENTION DEFICIT HYPERACTIVITY DISORDER (ADHD), UNSPECIFIED ADHD TYPE: ICD-10-CM

## 2021-06-21 DIAGNOSIS — F32.A ANXIETY AND DEPRESSION: ICD-10-CM

## 2021-06-21 PROBLEM — F33.9 RECURRENT MAJOR DEPRESSIVE DISORDER: Status: ACTIVE | Noted: 2021-06-21

## 2021-06-21 PROCEDURE — 99204 PR OFFICE/OUTPT VISIT, NEW, LEVL IV, 45-59 MIN: ICD-10-PCS | Mod: S$GLB,,, | Performed by: NURSE PRACTITIONER

## 2021-06-21 PROCEDURE — 99999 PR PBB SHADOW E&M-EST. PATIENT-LVL IV: ICD-10-PCS | Mod: PBBFAC,,, | Performed by: NURSE PRACTITIONER

## 2021-06-21 PROCEDURE — 3008F BODY MASS INDEX DOCD: CPT | Mod: CPTII,S$GLB,, | Performed by: NURSE PRACTITIONER

## 2021-06-21 PROCEDURE — 3008F PR BODY MASS INDEX (BMI) DOCUMENTED: ICD-10-PCS | Mod: CPTII,S$GLB,, | Performed by: NURSE PRACTITIONER

## 2021-06-21 PROCEDURE — 99999 PR PBB SHADOW E&M-EST. PATIENT-LVL IV: CPT | Mod: PBBFAC,,, | Performed by: NURSE PRACTITIONER

## 2021-06-21 PROCEDURE — 99204 OFFICE O/P NEW MOD 45 MIN: CPT | Mod: S$GLB,,, | Performed by: NURSE PRACTITIONER

## 2021-06-22 ENCOUNTER — PATIENT OUTREACH (OUTPATIENT)
Dept: ADMINISTRATIVE | Facility: HOSPITAL | Age: 26
End: 2021-06-22

## 2021-06-22 PROBLEM — F33.1 MDD (MAJOR DEPRESSIVE DISORDER), RECURRENT EPISODE, MODERATE: Status: ACTIVE | Noted: 2021-06-22

## 2021-06-22 RX ORDER — ESCITALOPRAM OXALATE 10 MG/1
10 TABLET ORAL DAILY
Qty: 61 TABLET | Refills: 0 | Status: SHIPPED | OUTPATIENT
Start: 2021-06-22 | End: 2021-07-19 | Stop reason: ALTCHOICE

## 2021-07-06 ENCOUNTER — IMMUNIZATION (OUTPATIENT)
Dept: FAMILY MEDICINE | Facility: CLINIC | Age: 26
End: 2021-07-06
Payer: COMMERCIAL

## 2021-07-06 ENCOUNTER — OFFICE VISIT (OUTPATIENT)
Dept: FAMILY MEDICINE | Facility: CLINIC | Age: 26
End: 2021-07-06
Payer: COMMERCIAL

## 2021-07-06 VITALS
TEMPERATURE: 98 F | RESPIRATION RATE: 16 BRPM | HEIGHT: 58 IN | OXYGEN SATURATION: 98 % | WEIGHT: 161.69 LBS | DIASTOLIC BLOOD PRESSURE: 80 MMHG | BODY MASS INDEX: 33.94 KG/M2 | SYSTOLIC BLOOD PRESSURE: 116 MMHG | HEART RATE: 106 BPM

## 2021-07-06 DIAGNOSIS — Z23 NEED FOR VACCINATION: Primary | ICD-10-CM

## 2021-07-06 DIAGNOSIS — F33.1 MDD (MAJOR DEPRESSIVE DISORDER), RECURRENT EPISODE, MODERATE: ICD-10-CM

## 2021-07-06 DIAGNOSIS — G43.009 MIGRAINE WITHOUT AURA AND WITHOUT STATUS MIGRAINOSUS, NOT INTRACTABLE: ICD-10-CM

## 2021-07-06 DIAGNOSIS — F41.1 GENERALIZED ANXIETY DISORDER: ICD-10-CM

## 2021-07-06 DIAGNOSIS — R74.01 ELEVATED SGOT (AST): ICD-10-CM

## 2021-07-06 DIAGNOSIS — Z00.00 ANNUAL PHYSICAL EXAM: Primary | ICD-10-CM

## 2021-07-06 PROCEDURE — 99999 PR PBB SHADOW E&M-EST. PATIENT-LVL IV: CPT | Mod: PBBFAC,,, | Performed by: NURSE PRACTITIONER

## 2021-07-06 PROCEDURE — 1126F PR PAIN SEVERITY QUANTIFIED, NO PAIN PRESENT: ICD-10-PCS | Mod: S$GLB,,, | Performed by: NURSE PRACTITIONER

## 2021-07-06 PROCEDURE — 1126F AMNT PAIN NOTED NONE PRSNT: CPT | Mod: S$GLB,,, | Performed by: NURSE PRACTITIONER

## 2021-07-06 PROCEDURE — 3008F PR BODY MASS INDEX (BMI) DOCUMENTED: ICD-10-PCS | Mod: CPTII,S$GLB,, | Performed by: NURSE PRACTITIONER

## 2021-07-06 PROCEDURE — 99395 PREV VISIT EST AGE 18-39: CPT | Mod: S$GLB,,, | Performed by: NURSE PRACTITIONER

## 2021-07-06 PROCEDURE — 99395 PR PREVENTIVE VISIT,EST,18-39: ICD-10-PCS | Mod: S$GLB,,, | Performed by: NURSE PRACTITIONER

## 2021-07-06 PROCEDURE — 3008F BODY MASS INDEX DOCD: CPT | Mod: CPTII,S$GLB,, | Performed by: NURSE PRACTITIONER

## 2021-07-06 PROCEDURE — 91300 COVID-19, MRNA, LNP-S, PF, 30 MCG/0.3 ML DOSE VACCINE: CPT | Mod: PBBFAC,PN

## 2021-07-06 PROCEDURE — 0002A COVID-19, MRNA, LNP-S, PF, 30 MCG/0.3 ML DOSE VACCINE: CPT | Mod: PBBFAC,PN

## 2021-07-06 PROCEDURE — 99999 PR PBB SHADOW E&M-EST. PATIENT-LVL IV: ICD-10-PCS | Mod: PBBFAC,,, | Performed by: NURSE PRACTITIONER

## 2021-07-19 ENCOUNTER — OFFICE VISIT (OUTPATIENT)
Dept: PSYCHIATRY | Facility: CLINIC | Age: 26
End: 2021-07-19
Payer: COMMERCIAL

## 2021-07-19 VITALS
HEART RATE: 104 BPM | OXYGEN SATURATION: 98 % | TEMPERATURE: 98 F | HEIGHT: 59 IN | RESPIRATION RATE: 16 BRPM | BODY MASS INDEX: 33.54 KG/M2 | SYSTOLIC BLOOD PRESSURE: 114 MMHG | DIASTOLIC BLOOD PRESSURE: 76 MMHG | WEIGHT: 166.38 LBS

## 2021-07-19 DIAGNOSIS — G47.9 SLEEP DIFFICULTIES: ICD-10-CM

## 2021-07-19 DIAGNOSIS — F41.1 GENERALIZED ANXIETY DISORDER: ICD-10-CM

## 2021-07-19 DIAGNOSIS — F33.1 MDD (MAJOR DEPRESSIVE DISORDER), RECURRENT EPISODE, MODERATE: ICD-10-CM

## 2021-07-19 DIAGNOSIS — F41.1 GAD (GENERALIZED ANXIETY DISORDER): Primary | ICD-10-CM

## 2021-07-19 PROCEDURE — 1126F PR PAIN SEVERITY QUANTIFIED, NO PAIN PRESENT: ICD-10-PCS | Mod: CPTII,S$GLB,, | Performed by: NURSE PRACTITIONER

## 2021-07-19 PROCEDURE — 99213 PR OFFICE/OUTPT VISIT, EST, LEVL III, 20-29 MIN: ICD-10-PCS | Mod: S$GLB,,, | Performed by: NURSE PRACTITIONER

## 2021-07-19 PROCEDURE — 3008F PR BODY MASS INDEX (BMI) DOCUMENTED: ICD-10-PCS | Mod: CPTII,S$GLB,, | Performed by: NURSE PRACTITIONER

## 2021-07-19 PROCEDURE — 99999 PR PBB SHADOW E&M-EST. PATIENT-LVL III: ICD-10-PCS | Mod: PBBFAC,,, | Performed by: NURSE PRACTITIONER

## 2021-07-19 PROCEDURE — 99999 PR PBB SHADOW E&M-EST. PATIENT-LVL III: CPT | Mod: PBBFAC,,, | Performed by: NURSE PRACTITIONER

## 2021-07-19 PROCEDURE — 3008F BODY MASS INDEX DOCD: CPT | Mod: CPTII,S$GLB,, | Performed by: NURSE PRACTITIONER

## 2021-07-19 PROCEDURE — 99213 OFFICE O/P EST LOW 20 MIN: CPT | Mod: S$GLB,,, | Performed by: NURSE PRACTITIONER

## 2021-07-19 PROCEDURE — 1126F AMNT PAIN NOTED NONE PRSNT: CPT | Mod: CPTII,S$GLB,, | Performed by: NURSE PRACTITIONER

## 2021-07-19 RX ORDER — ESCITALOPRAM OXALATE 10 MG/1
10 TABLET ORAL DAILY
Qty: 30 TABLET | Refills: 2 | Status: SHIPPED | OUTPATIENT
Start: 2021-07-19 | End: 2021-08-19 | Stop reason: SDUPTHER

## 2021-08-19 ENCOUNTER — OFFICE VISIT (OUTPATIENT)
Dept: PSYCHIATRY | Facility: CLINIC | Age: 26
End: 2021-08-19
Payer: COMMERCIAL

## 2021-08-19 DIAGNOSIS — F41.1 GAD (GENERALIZED ANXIETY DISORDER): ICD-10-CM

## 2021-08-19 DIAGNOSIS — F33.1 MDD (MAJOR DEPRESSIVE DISORDER), RECURRENT EPISODE, MODERATE: ICD-10-CM

## 2021-08-19 PROCEDURE — 99213 OFFICE O/P EST LOW 20 MIN: CPT | Mod: 95,,, | Performed by: NURSE PRACTITIONER

## 2021-08-19 PROCEDURE — 99213 PR OFFICE/OUTPT VISIT, EST, LEVL III, 20-29 MIN: ICD-10-PCS | Mod: 95,,, | Performed by: NURSE PRACTITIONER

## 2021-08-19 RX ORDER — ESCITALOPRAM OXALATE 10 MG/1
10 TABLET ORAL DAILY
Qty: 30 TABLET | Refills: 2 | Status: SHIPPED | OUTPATIENT
Start: 2021-08-19 | End: 2021-09-16 | Stop reason: SDUPTHER

## 2021-08-19 RX ORDER — ESCITALOPRAM OXALATE 10 MG/1
10 TABLET ORAL DAILY
Qty: 30 TABLET | Refills: 2 | Status: SHIPPED | OUTPATIENT
Start: 2021-08-19 | End: 2021-08-19 | Stop reason: SDUPTHER

## 2021-08-27 ENCOUNTER — OFFICE VISIT (OUTPATIENT)
Dept: PSYCHIATRY | Facility: CLINIC | Age: 26
End: 2021-08-27
Payer: COMMERCIAL

## 2021-08-27 DIAGNOSIS — F41.1 GENERALIZED ANXIETY DISORDER: ICD-10-CM

## 2021-08-27 PROCEDURE — 99999 PR PBB SHADOW E&M-EST. PATIENT-LVL I: ICD-10-PCS | Mod: PBBFAC,,, | Performed by: CASE MANAGER/CARE COORDINATOR

## 2021-08-27 PROCEDURE — 99999 PR PBB SHADOW E&M-EST. PATIENT-LVL I: CPT | Mod: PBBFAC,,, | Performed by: CASE MANAGER/CARE COORDINATOR

## 2021-09-03 ENCOUNTER — PATIENT MESSAGE (OUTPATIENT)
Dept: NEUROLOGY | Facility: CLINIC | Age: 26
End: 2021-09-03

## 2021-09-17 ENCOUNTER — CLINICAL SUPPORT (OUTPATIENT)
Dept: URGENT CARE | Facility: CLINIC | Age: 26
End: 2021-09-17
Payer: COMMERCIAL

## 2021-09-17 ENCOUNTER — OFFICE VISIT (OUTPATIENT)
Dept: PSYCHIATRY | Facility: CLINIC | Age: 26
End: 2021-09-17
Payer: COMMERCIAL

## 2021-09-17 VITALS
HEIGHT: 59 IN | BODY MASS INDEX: 35.58 KG/M2 | OXYGEN SATURATION: 99 % | DIASTOLIC BLOOD PRESSURE: 73 MMHG | WEIGHT: 176.5 LBS | HEART RATE: 81 BPM | SYSTOLIC BLOOD PRESSURE: 102 MMHG | RESPIRATION RATE: 16 BRPM

## 2021-09-17 DIAGNOSIS — G47.9 SLEEP DIFFICULTIES: ICD-10-CM

## 2021-09-17 DIAGNOSIS — F41.1 GAD (GENERALIZED ANXIETY DISORDER): Primary | ICD-10-CM

## 2021-09-17 DIAGNOSIS — F33.1 MDD (MAJOR DEPRESSIVE DISORDER), RECURRENT EPISODE, MODERATE: ICD-10-CM

## 2021-09-17 DIAGNOSIS — Z20.822 ENCOUNTER FOR LABORATORY TESTING FOR COVID-19 VIRUS: Primary | ICD-10-CM

## 2021-09-17 LAB
CTP QC/QA: YES
SARS-COV-2 RDRP RESP QL NAA+PROBE: NEGATIVE

## 2021-09-17 PROCEDURE — 3008F BODY MASS INDEX DOCD: CPT | Mod: CPTII,S$GLB,, | Performed by: NURSE PRACTITIONER

## 2021-09-17 PROCEDURE — U0002 COVID-19 LAB TEST NON-CDC: HCPCS | Mod: QW,S$GLB,, | Performed by: PHYSICIAN ASSISTANT

## 2021-09-17 PROCEDURE — 99999 PR PBB SHADOW E&M-EST. PATIENT-LVL III: CPT | Mod: PBBFAC,,, | Performed by: NURSE PRACTITIONER

## 2021-09-17 PROCEDURE — 3078F PR MOST RECENT DIASTOLIC BLOOD PRESSURE < 80 MM HG: ICD-10-PCS | Mod: CPTII,S$GLB,, | Performed by: NURSE PRACTITIONER

## 2021-09-17 PROCEDURE — 3078F DIAST BP <80 MM HG: CPT | Mod: CPTII,S$GLB,, | Performed by: NURSE PRACTITIONER

## 2021-09-17 PROCEDURE — 3008F PR BODY MASS INDEX (BMI) DOCUMENTED: ICD-10-PCS | Mod: CPTII,S$GLB,, | Performed by: NURSE PRACTITIONER

## 2021-09-17 PROCEDURE — 3074F PR MOST RECENT SYSTOLIC BLOOD PRESSURE < 130 MM HG: ICD-10-PCS | Mod: CPTII,S$GLB,, | Performed by: NURSE PRACTITIONER

## 2021-09-17 PROCEDURE — 99999 PR PBB SHADOW E&M-EST. PATIENT-LVL III: ICD-10-PCS | Mod: PBBFAC,,, | Performed by: NURSE PRACTITIONER

## 2021-09-17 PROCEDURE — 99214 PR OFFICE/OUTPT VISIT, EST, LEVL IV, 30-39 MIN: ICD-10-PCS | Mod: S$GLB,,, | Performed by: NURSE PRACTITIONER

## 2021-09-17 PROCEDURE — U0002: ICD-10-PCS | Mod: QW,S$GLB,, | Performed by: PHYSICIAN ASSISTANT

## 2021-09-17 PROCEDURE — 99214 OFFICE O/P EST MOD 30 MIN: CPT | Mod: S$GLB,,, | Performed by: NURSE PRACTITIONER

## 2021-09-17 PROCEDURE — 3074F SYST BP LT 130 MM HG: CPT | Mod: CPTII,S$GLB,, | Performed by: NURSE PRACTITIONER

## 2021-09-26 ENCOUNTER — PATIENT MESSAGE (OUTPATIENT)
Dept: FAMILY MEDICINE | Facility: CLINIC | Age: 26
End: 2021-09-26

## 2021-09-26 DIAGNOSIS — E66.9 CLASS 2 OBESITY WITHOUT SERIOUS COMORBIDITY WITH BODY MASS INDEX (BMI) OF 35.0 TO 35.9 IN ADULT, UNSPECIFIED OBESITY TYPE: Primary | ICD-10-CM

## 2021-09-27 ENCOUNTER — PATIENT MESSAGE (OUTPATIENT)
Dept: FAMILY MEDICINE | Facility: CLINIC | Age: 26
End: 2021-09-27

## 2021-10-04 ENCOUNTER — OFFICE VISIT (OUTPATIENT)
Dept: PSYCHIATRY | Facility: CLINIC | Age: 26
End: 2021-10-04
Payer: COMMERCIAL

## 2021-10-04 DIAGNOSIS — F41.1 GAD (GENERALIZED ANXIETY DISORDER): Primary | ICD-10-CM

## 2021-10-04 DIAGNOSIS — F32.A ANXIETY AND DEPRESSION: ICD-10-CM

## 2021-10-04 DIAGNOSIS — F33.1 MDD (MAJOR DEPRESSIVE DISORDER), RECURRENT EPISODE, MODERATE: ICD-10-CM

## 2021-10-04 DIAGNOSIS — F41.9 ANXIETY AND DEPRESSION: ICD-10-CM

## 2021-10-04 DIAGNOSIS — F90.9 ATTENTION DEFICIT HYPERACTIVITY DISORDER (ADHD), UNSPECIFIED ADHD TYPE: ICD-10-CM

## 2021-10-15 ENCOUNTER — OFFICE VISIT (OUTPATIENT)
Dept: PSYCHIATRY | Facility: CLINIC | Age: 26
End: 2021-10-15
Payer: COMMERCIAL

## 2021-10-15 DIAGNOSIS — F33.1 MDD (MAJOR DEPRESSIVE DISORDER), RECURRENT EPISODE, MODERATE: ICD-10-CM

## 2021-10-15 DIAGNOSIS — G47.9 SLEEP DIFFICULTIES: ICD-10-CM

## 2021-10-15 DIAGNOSIS — F41.1 GAD (GENERALIZED ANXIETY DISORDER): Primary | ICD-10-CM

## 2021-10-15 PROCEDURE — 99999 PR PBB SHADOW E&M-EST. PATIENT-LVL II: ICD-10-PCS | Mod: PBBFAC,,, | Performed by: NURSE PRACTITIONER

## 2021-10-15 PROCEDURE — 99999 PR PBB SHADOW E&M-EST. PATIENT-LVL II: CPT | Mod: PBBFAC,,, | Performed by: NURSE PRACTITIONER

## 2021-10-15 PROCEDURE — 99214 OFFICE O/P EST MOD 30 MIN: CPT | Mod: S$GLB,,, | Performed by: NURSE PRACTITIONER

## 2021-10-15 PROCEDURE — 99214 PR OFFICE/OUTPT VISIT, EST, LEVL IV, 30-39 MIN: ICD-10-PCS | Mod: S$GLB,,, | Performed by: NURSE PRACTITIONER

## 2021-10-15 RX ORDER — ESCITALOPRAM OXALATE 10 MG/1
10 TABLET ORAL DAILY
Qty: 30 TABLET | Refills: 2 | Status: SHIPPED | OUTPATIENT
Start: 2021-10-15 | End: 2022-06-16 | Stop reason: SDUPTHER

## 2021-10-25 ENCOUNTER — OFFICE VISIT (OUTPATIENT)
Dept: PSYCHIATRY | Facility: CLINIC | Age: 26
End: 2021-10-25
Payer: COMMERCIAL

## 2021-10-25 DIAGNOSIS — F41.1 GAD (GENERALIZED ANXIETY DISORDER): Primary | ICD-10-CM

## 2021-11-04 ENCOUNTER — OFFICE VISIT (OUTPATIENT)
Dept: FAMILY MEDICINE | Facility: CLINIC | Age: 26
End: 2021-11-04
Payer: COMMERCIAL

## 2021-11-04 VITALS
WEIGHT: 191.69 LBS | HEIGHT: 59 IN | TEMPERATURE: 98 F | BODY MASS INDEX: 38.64 KG/M2 | OXYGEN SATURATION: 99 % | HEART RATE: 98 BPM | SYSTOLIC BLOOD PRESSURE: 112 MMHG | RESPIRATION RATE: 16 BRPM | DIASTOLIC BLOOD PRESSURE: 88 MMHG

## 2021-11-04 DIAGNOSIS — F33.1 MDD (MAJOR DEPRESSIVE DISORDER), RECURRENT EPISODE, MODERATE: ICD-10-CM

## 2021-11-04 DIAGNOSIS — R74.01 ELEVATED SGOT (AST): Primary | ICD-10-CM

## 2021-11-04 DIAGNOSIS — Z00.00 ENCOUNTER FOR BLOOD TEST FOR ROUTINE GENERAL PHYSICAL EXAMINATION: ICD-10-CM

## 2021-11-04 DIAGNOSIS — Z23 NEED FOR TDAP VACCINATION: ICD-10-CM

## 2021-11-04 DIAGNOSIS — F41.1 GENERALIZED ANXIETY DISORDER: ICD-10-CM

## 2021-11-04 DIAGNOSIS — Z13.29 THYROID DISORDER SCREEN: ICD-10-CM

## 2021-11-04 DIAGNOSIS — Z13.1 DIABETES MELLITUS SCREENING: ICD-10-CM

## 2021-11-04 DIAGNOSIS — Z13.0 SCREENING FOR DEFICIENCY ANEMIA: ICD-10-CM

## 2021-11-04 DIAGNOSIS — Z13.220 SCREENING CHOLESTEROL LEVEL: ICD-10-CM

## 2021-11-04 DIAGNOSIS — G43.009 MIGRAINE WITHOUT AURA AND WITHOUT STATUS MIGRAINOSUS, NOT INTRACTABLE: ICD-10-CM

## 2021-11-04 PROCEDURE — 90471 TDAP VACCINE GREATER THAN OR EQUAL TO 7YO IM: ICD-10-PCS | Mod: S$GLB,,, | Performed by: NURSE PRACTITIONER

## 2021-11-04 PROCEDURE — 99999 PR PBB SHADOW E&M-EST. PATIENT-LVL IV: ICD-10-PCS | Mod: PBBFAC,,, | Performed by: NURSE PRACTITIONER

## 2021-11-04 PROCEDURE — 99214 PR OFFICE/OUTPT VISIT, EST, LEVL IV, 30-39 MIN: ICD-10-PCS | Mod: 25,S$GLB,, | Performed by: NURSE PRACTITIONER

## 2021-11-04 PROCEDURE — 1160F RVW MEDS BY RX/DR IN RCRD: CPT | Mod: CPTII,S$GLB,, | Performed by: NURSE PRACTITIONER

## 2021-11-04 PROCEDURE — 3074F PR MOST RECENT SYSTOLIC BLOOD PRESSURE < 130 MM HG: ICD-10-PCS | Mod: CPTII,S$GLB,, | Performed by: NURSE PRACTITIONER

## 2021-11-04 PROCEDURE — 3079F DIAST BP 80-89 MM HG: CPT | Mod: CPTII,S$GLB,, | Performed by: NURSE PRACTITIONER

## 2021-11-04 PROCEDURE — 1159F MED LIST DOCD IN RCRD: CPT | Mod: CPTII,S$GLB,, | Performed by: NURSE PRACTITIONER

## 2021-11-04 PROCEDURE — 90715 TDAP VACCINE 7 YRS/> IM: CPT | Mod: S$GLB,,, | Performed by: NURSE PRACTITIONER

## 2021-11-04 PROCEDURE — 90471 IMMUNIZATION ADMIN: CPT | Mod: S$GLB,,, | Performed by: NURSE PRACTITIONER

## 2021-11-04 PROCEDURE — 3079F PR MOST RECENT DIASTOLIC BLOOD PRESSURE 80-89 MM HG: ICD-10-PCS | Mod: CPTII,S$GLB,, | Performed by: NURSE PRACTITIONER

## 2021-11-04 PROCEDURE — 3008F BODY MASS INDEX DOCD: CPT | Mod: CPTII,S$GLB,, | Performed by: NURSE PRACTITIONER

## 2021-11-04 PROCEDURE — 90715 TDAP VACCINE GREATER THAN OR EQUAL TO 7YO IM: ICD-10-PCS | Mod: S$GLB,,, | Performed by: NURSE PRACTITIONER

## 2021-11-04 PROCEDURE — 99214 OFFICE O/P EST MOD 30 MIN: CPT | Mod: 25,S$GLB,, | Performed by: NURSE PRACTITIONER

## 2021-11-04 PROCEDURE — 99999 PR PBB SHADOW E&M-EST. PATIENT-LVL IV: CPT | Mod: PBBFAC,,, | Performed by: NURSE PRACTITIONER

## 2021-11-04 PROCEDURE — 1159F PR MEDICATION LIST DOCUMENTED IN MEDICAL RECORD: ICD-10-PCS | Mod: CPTII,S$GLB,, | Performed by: NURSE PRACTITIONER

## 2021-11-04 PROCEDURE — 1160F PR REVIEW ALL MEDS BY PRESCRIBER/CLIN PHARMACIST DOCUMENTED: ICD-10-PCS | Mod: CPTII,S$GLB,, | Performed by: NURSE PRACTITIONER

## 2021-11-04 PROCEDURE — 3008F PR BODY MASS INDEX (BMI) DOCUMENTED: ICD-10-PCS | Mod: CPTII,S$GLB,, | Performed by: NURSE PRACTITIONER

## 2021-11-04 PROCEDURE — 3074F SYST BP LT 130 MM HG: CPT | Mod: CPTII,S$GLB,, | Performed by: NURSE PRACTITIONER

## 2021-11-22 ENCOUNTER — OFFICE VISIT (OUTPATIENT)
Dept: PSYCHIATRY | Facility: CLINIC | Age: 26
End: 2021-11-22
Payer: COMMERCIAL

## 2021-11-22 ENCOUNTER — PATIENT MESSAGE (OUTPATIENT)
Dept: PSYCHIATRY | Facility: CLINIC | Age: 26
End: 2021-11-22
Payer: COMMERCIAL

## 2021-11-22 DIAGNOSIS — F41.1 GAD (GENERALIZED ANXIETY DISORDER): Primary | ICD-10-CM

## 2022-02-18 ENCOUNTER — OFFICE VISIT (OUTPATIENT)
Dept: PSYCHIATRY | Facility: CLINIC | Age: 27
End: 2022-02-18
Payer: MEDICAID

## 2022-02-18 DIAGNOSIS — F41.1 GAD (GENERALIZED ANXIETY DISORDER): Primary | ICD-10-CM

## 2022-02-18 PROCEDURE — 90834 PR PSYCHOTHERAPY W/PATIENT, 45 MIN: ICD-10-PCS | Mod: S$GLB,,, | Performed by: CASE MANAGER/CARE COORDINATOR

## 2022-02-18 PROCEDURE — 90834 PSYTX W PT 45 MINUTES: CPT | Mod: S$GLB,,, | Performed by: CASE MANAGER/CARE COORDINATOR

## 2022-02-18 NOTE — PROGRESS NOTES
Individual Psychotherapy (PhD/LCSW)    2/18/2022    Site:  [facility]    Therapeutic Intervention: Met with patient.  Outpatient - Supportive psychotherapy 45 min - CPT Code 67628    Chief complaint/reason for encounter: depression, anxiety, sleep and interpersonal     Interval history and content of current session: Pt was last seen by writer on November 22, 2021. Pt reports many interpersonal issues/instances that have happened since last session. Pt reports her step-father who raised her passed away last Tuesday, her pet rat passed away, and her boyfriend broke up with her all in a one week time span. Pt states that her step father has been ill for years and decided to go into hospice care and not continue treating his illnesses. She states he legally adopted her a week or so before passing. She states stress with his ex wife and daughter trying to claim his things before he passed away. She states that her apartment has been stressful with the renovations from [place] still taking place. She states that losing her pet made her sad. She decided to adopt two cats her previous boyfriend said she could not have. She states that since breaking up with the boyfriend her friends that stopped talking to her have regained contact with her which makes her happy. She state she has more help at work which has made it less stressful. She states it will be difficult to help her mother clean her step father's home out. Pt states this week she has been off due to the family death. She reports periods of sleeping too much and too little.    Treatment plan:  · Target symptoms: depression, anxiety , adjustment, grief  · Why chosen therapy is appropriate versus another modality: relevant to diagnosis, patient responds to this modality, evidence based practice  · Outcome monitoring methods: self-report, observation  · Therapeutic intervention type: supportive psychotherapy    Risk parameters:  Patient reports no suicidal  ideation  Patient reports no homicidal ideation  Patient reports no self-injurious behavior  Patient reports no violent behavior    Verbal deficits: None    Patient's response to intervention:  The patient's response to intervention is accepting.  Pt continues to use humor to avoid feelings. Pt has difficulty identifying feelings during session and instead tries to redirect the conversation.    Progress toward goals and other mental status changes:  The patient's progress toward goals is limited. Pt states she attempted the feelings map discussed in last session and found it difficult. She states she's better at finding quotes, art work, writing paragraphs about how she feels and has agreed to bring this to next session for processing.    Diagnosis:     ICD-10-CM ICD-9-CM   1. CLARIBEL (generalized anxiety disorder)  F41.1 300.02       Plan:  individual psychotherapy Pt will continue to process life events and attempt to name feelings associated with emotions rather than to use humor as main coping mechanism. Pt will continue to process difficult emotions and things from her present/past.    Return to clinic: 1 month    Length of Service (minutes): 45

## 2022-03-18 ENCOUNTER — OFFICE VISIT (OUTPATIENT)
Dept: PSYCHIATRY | Facility: CLINIC | Age: 27
End: 2022-03-18
Payer: COMMERCIAL

## 2022-03-18 DIAGNOSIS — F41.9 ANXIETY AND DEPRESSION: ICD-10-CM

## 2022-03-18 DIAGNOSIS — F32.A ANXIETY AND DEPRESSION: ICD-10-CM

## 2022-03-18 DIAGNOSIS — F41.1 GAD (GENERALIZED ANXIETY DISORDER): Primary | ICD-10-CM

## 2022-03-18 PROCEDURE — 90834 PR PSYCHOTHERAPY W/PATIENT, 45 MIN: ICD-10-PCS | Mod: S$GLB,,, | Performed by: CASE MANAGER/CARE COORDINATOR

## 2022-03-18 PROCEDURE — 90834 PSYTX W PT 45 MINUTES: CPT | Mod: S$GLB,,, | Performed by: CASE MANAGER/CARE COORDINATOR

## 2022-03-18 NOTE — PROGRESS NOTES
"Individual Psychotherapy (PhD/LCSW)    3/18/2022    Site:  [facility]    Therapeutic Intervention: Met with patient.  Outpatient - Insight oriented psychotherapy 45 min - CPT code 99793    Chief complaint/reason for encounter: depression, anxiety and interpersonal     Interval history and content of current session: Pt was last seen by writer on 2/18/22. Pt states she has been trying to work through the grieving process of her father's death. Pt states she's been having to clean out his apartment. She states "I don't want to empty it. I just want to be able to sit there when I need to". Pt states her biological father sent her hurtful messages which upset her following her step father's death who raised her. She states she has since blocked him. Pt states that she does not allow herself to feel things because she feels like she needs to be there for everyone else, especially her mother. She states she's been staying with friends because she does not want to be home alone to think about her feelings/grief. She states when she is home she goes into a "depression spiral". She thinks too much. Pt states she's tried to stay busy to not think about the passing of her father. She states work is going well, however, they do continue to lose staff. Pt states she's connecting with friends which has been helpful and talking with her mother which she considers her greatest support. Pt continues to laugh to cope with stressful topics.    Treatment plan:  · Target symptoms: anxiety , adjustment, grief  · Why chosen therapy is appropriate versus another modality: relevant to diagnosis, patient responds to this modality, evidence based practice  · Outcome monitoring methods: self-report, observation  · Therapeutic intervention type: insight oriented psychotherapy    Risk parameters:  Patient reports no suicidal ideation  Patient reports no homicidal ideation  Patient reports no self-injurious behavior  Patient reports " no violent behavior    Verbal deficits: None    Patient's response to intervention:  The patient's response to intervention is accepting. Pt was receptive to staff interventions. Pt is still guarded when talking more in depth about feelings.    Progress toward goals and other mental status changes:  The patient's progress toward goals is fair . Pt is able to start processing feelings and putting names to them.    Diagnosis:   No diagnosis found.    Plan:  individual psychotherapy Pt will continue to process trauma and grief with staff. Pt will continue to learn to name her feelings and feel them rather than repress.    Return to clinic: 1 month    Length of Service (minutes): 45

## 2022-04-22 ENCOUNTER — OFFICE VISIT (OUTPATIENT)
Dept: PSYCHIATRY | Facility: CLINIC | Age: 27
End: 2022-04-22
Payer: MEDICAID

## 2022-04-22 DIAGNOSIS — F43.21 UNRESOLVED GRIEF: ICD-10-CM

## 2022-04-22 DIAGNOSIS — F33.1 MDD (MAJOR DEPRESSIVE DISORDER), RECURRENT EPISODE, MODERATE: ICD-10-CM

## 2022-04-22 DIAGNOSIS — F41.1 GAD (GENERALIZED ANXIETY DISORDER): Primary | ICD-10-CM

## 2022-04-22 PROCEDURE — 90832 PR PSYCHOTHERAPY W/PATIENT, 30 MIN: ICD-10-PCS | Mod: S$GLB,,, | Performed by: CASE MANAGER/CARE COORDINATOR

## 2022-04-22 PROCEDURE — 90832 PSYTX W PT 30 MINUTES: CPT | Mod: S$GLB,,, | Performed by: CASE MANAGER/CARE COORDINATOR

## 2022-04-22 NOTE — PROGRESS NOTES
"Individual Psychotherapy (PhD/LCSW)    4/22/2022    Site:  [facility]     Therapeutic Intervention: Met with patient.  Outpatient - Behavior modifying psychotherapy 30 min - CPT code 73695    Chief complaint/reason for encounter: attention deficit, depression, appetite and interpersonal     Interval history and content of current session: Pt was last seen by writer on 3/18/22. Pt states she's still struggling re: grief over her father's death. States she does not feel like she should be allowed to grieve and if so, she allots five minute intervals alone to do so. Pt states her relationship was difficult with her father, especially josephine she was younger and she did not expect to be as close to him as she was when he passed and/or for his death to affect her so much. She states that she feels like she must take care of others and put her grief in the back of her mind. Pt made a reference to when she was in therapy as a child and the therapist would let her hide under the desk for comfort. Pt states her ex boyfriend also told her she should not feel the feelings she was experiencing. Pt states she and her mother plan to finish cleaning out her father's apartment in the next two weeks. She state she's only been going home a few hours a day and sleeping at friend's houses so she does not have to be home and think about her feelings. Staff and pt discussed pt using humor and intellectualization to evade her feelings. Pt states her appetite is almost back to normal. Pt states at one point, since last session she was only eating "once or twice a week". Staff educated on the importance of healthy eating habits.    Treatment plan:  · Target symptoms: depression, distractability, anxiety , adjustment, grief  · Why chosen therapy is appropriate versus another modality: relevant to diagnosis, patient responds to this modality, evidence based practice  · Outcome monitoring methods: self-report, " observation  · Therapeutic intervention type: behavior modifying psychotherapy    Risk parameters:  Patient reports no suicidal ideation  Patient reports no homicidal ideation  Patient reports no self-injurious behavior  Patient reports no violent behavior    Verbal deficits: None    Patient's response to intervention:  The patient's response to intervention is guarded. Pt continues to use humor and/or intellectualization to avoid feelings. Pt was open during this session to begin to discuss her grief/feelings.    Progress toward goals and other mental status changes:  The patient's progress toward goals is fair . Pt bought a self care journal to begin more self care efforts. Pt has been working to clean her father's apartment out for the next tenant set to move in in May. Staff discussed with staff ways that she can learn to express her feelings and exercises to start working on this goal.    Diagnosis:   No diagnosis found.    Plan:  individual psychotherapy Pt will continue to work with staff in regard to intellectualization. Pt will work with staff to express feelings in a healthy way and put names to the way she feels. Pt states goals before next session are to stay home more than one day, begin to allow herself to grieve, and begin expressing feelings.    Return to clinic: 1 month    Length of Service (minutes): 45

## 2022-05-30 ENCOUNTER — DOCUMENTATION ONLY (OUTPATIENT)
Dept: BARIATRICS | Facility: CLINIC | Age: 27
End: 2022-05-30
Payer: MEDICAID

## 2022-06-03 ENCOUNTER — OFFICE VISIT (OUTPATIENT)
Dept: PSYCHIATRY | Facility: CLINIC | Age: 27
End: 2022-06-03
Payer: COMMERCIAL

## 2022-06-03 DIAGNOSIS — F43.21 UNRESOLVED GRIEF: ICD-10-CM

## 2022-06-03 DIAGNOSIS — F33.1 MDD (MAJOR DEPRESSIVE DISORDER), RECURRENT EPISODE, MODERATE: Primary | ICD-10-CM

## 2022-06-03 DIAGNOSIS — F41.1 GAD (GENERALIZED ANXIETY DISORDER): ICD-10-CM

## 2022-06-03 DIAGNOSIS — F32.A ANXIETY AND DEPRESSION: ICD-10-CM

## 2022-06-03 DIAGNOSIS — F41.9 ANXIETY AND DEPRESSION: ICD-10-CM

## 2022-06-03 PROCEDURE — 90834 PSYTX W PT 45 MINUTES: CPT | Mod: S$GLB,,, | Performed by: CASE MANAGER/CARE COORDINATOR

## 2022-06-03 PROCEDURE — 90834 PR PSYCHOTHERAPY W/PATIENT, 45 MIN: ICD-10-PCS | Mod: S$GLB,,, | Performed by: CASE MANAGER/CARE COORDINATOR

## 2022-06-03 NOTE — PROGRESS NOTES
"Individual Psychotherapy (PhD/LCSW)    6/3/2022    Site:  [facility]    Therapeutic Intervention: Met with patient.  Outpatient - Behavior modifying psychotherapy 45 min - CPT code 03955    Chief complaint/reason for encounter: attention deficit, depression, anxiety and interpersonal     Interval history and content of current session: Pt was last seen by writer on 4/22/22. Pt continues to use humor during her sessions to evade talking about her feelings. Pt reports she's still been staying with friends instead of her own home "to avoid having to go there and feel/think about things". Pt states she began dating the friend she was staying with. She states he's been very supportive and helps to push her to talk about her feelings when she seems overwhelmed. Pt reports she has removed all but one load of things from her father's apartment, and the person who has moved into the apartment has been very understanding that she will get it soon. Pt reports that her cat has been very sick and that has been causing her stress. She has received more duties at work which she finds helpful as "it keeps me from being bored and thinking too much". Pt brought her journal from when she was a teen and allowed staff to read several passages.Pt reports she still relates to several of the depressive thoughts in that journal. Pt is still having difficulty grieving her father's death.     Treatment plan:  · Target symptoms: depression, anxiety , adjustment, grief  · Why chosen therapy is appropriate versus another modality: relevant to diagnosis, patient responds to this modality, evidence based practice  · Outcome monitoring methods: self-report, observation  · Therapeutic intervention type: behavior modifying psychotherapy    Risk parameters:  Patient reports no suicidal ideation  Patient reports no homicidal ideation  Patient reports no self-injurious behavior  Patient reports no violent behavior    Verbal deficits: " None    Patient's response to intervention:  The patient's response to intervention is guarded. Pt was able to talk with staff, however, still evades feelings with humor.    Progress toward goals and other mental status changes:  The patient's progress toward goals is fair . Pt has not been staying at her apartment in an effort to avoid feelings. Pt has been successful in talking with her boyfriend more about her feelings. Pt is still in the grief process. Pt has been off of her medication for depression x 1 month due to lack of refills and she feels this has causes mood shifts.     Diagnosis:   No diagnosis found.    Plan:  individual psychotherapy Pt states her goal is to spend 2-3 nights at her apartment, one alone and 2 with her boyfriend to begin acclimating to be able to stay there. She reports she wants to get her apartment clean, orderly, back in shape to be happier there. Pt reports she wants to start going to the gym to improve her mental health. Staff will continue to work with pt to express feelings, process grief, and work on depressive symptoms. Pt plans to follow up with psychiatry appointment for medication management/refills. Pt was given information on [facility] referrals as she feels she may need to get back on her medications for ADHD.    Return to clinic: 1 month, as needed    Length of Service (minutes): 60

## 2022-06-08 ENCOUNTER — OFFICE VISIT (OUTPATIENT)
Dept: NEUROLOGY | Facility: CLINIC | Age: 27
End: 2022-06-08
Payer: MEDICAID

## 2022-06-08 VITALS
BODY MASS INDEX: 38.87 KG/M2 | DIASTOLIC BLOOD PRESSURE: 88 MMHG | SYSTOLIC BLOOD PRESSURE: 132 MMHG | HEART RATE: 105 BPM | HEIGHT: 58 IN | WEIGHT: 185.19 LBS

## 2022-06-08 DIAGNOSIS — G43.009 MIGRAINE WITHOUT AURA AND WITHOUT STATUS MIGRAINOSUS, NOT INTRACTABLE: ICD-10-CM

## 2022-06-08 PROCEDURE — 99213 OFFICE O/P EST LOW 20 MIN: CPT | Mod: PBBFAC | Performed by: PHYSICIAN ASSISTANT

## 2022-06-08 PROCEDURE — 99214 PR OFFICE/OUTPT VISIT, EST, LEVL IV, 30-39 MIN: ICD-10-PCS | Mod: S$PBB,,, | Performed by: PHYSICIAN ASSISTANT

## 2022-06-08 PROCEDURE — 3008F BODY MASS INDEX DOCD: CPT | Mod: CPTII,,, | Performed by: PHYSICIAN ASSISTANT

## 2022-06-08 PROCEDURE — 99214 OFFICE O/P EST MOD 30 MIN: CPT | Mod: S$PBB,,, | Performed by: PHYSICIAN ASSISTANT

## 2022-06-08 PROCEDURE — 1160F PR REVIEW ALL MEDS BY PRESCRIBER/CLIN PHARMACIST DOCUMENTED: ICD-10-PCS | Mod: CPTII,,, | Performed by: PHYSICIAN ASSISTANT

## 2022-06-08 PROCEDURE — 99999 PR PBB SHADOW E&M-EST. PATIENT-LVL III: CPT | Mod: PBBFAC,,, | Performed by: PHYSICIAN ASSISTANT

## 2022-06-08 PROCEDURE — 1159F MED LIST DOCD IN RCRD: CPT | Mod: CPTII,,, | Performed by: PHYSICIAN ASSISTANT

## 2022-06-08 PROCEDURE — 3079F PR MOST RECENT DIASTOLIC BLOOD PRESSURE 80-89 MM HG: ICD-10-PCS | Mod: CPTII,,, | Performed by: PHYSICIAN ASSISTANT

## 2022-06-08 PROCEDURE — 1160F RVW MEDS BY RX/DR IN RCRD: CPT | Mod: CPTII,,, | Performed by: PHYSICIAN ASSISTANT

## 2022-06-08 PROCEDURE — 1159F PR MEDICATION LIST DOCUMENTED IN MEDICAL RECORD: ICD-10-PCS | Mod: CPTII,,, | Performed by: PHYSICIAN ASSISTANT

## 2022-06-08 PROCEDURE — 3075F PR MOST RECENT SYSTOLIC BLOOD PRESS GE 130-139MM HG: ICD-10-PCS | Mod: CPTII,,, | Performed by: PHYSICIAN ASSISTANT

## 2022-06-08 PROCEDURE — 3079F DIAST BP 80-89 MM HG: CPT | Mod: CPTII,,, | Performed by: PHYSICIAN ASSISTANT

## 2022-06-08 PROCEDURE — 99999 PR PBB SHADOW E&M-EST. PATIENT-LVL III: ICD-10-PCS | Mod: PBBFAC,,, | Performed by: PHYSICIAN ASSISTANT

## 2022-06-08 PROCEDURE — 3075F SYST BP GE 130 - 139MM HG: CPT | Mod: CPTII,,, | Performed by: PHYSICIAN ASSISTANT

## 2022-06-08 PROCEDURE — 3008F PR BODY MASS INDEX (BMI) DOCUMENTED: ICD-10-PCS | Mod: CPTII,,, | Performed by: PHYSICIAN ASSISTANT

## 2022-06-08 RX ORDER — AMITRIPTYLINE HYDROCHLORIDE 75 MG/1
75 TABLET ORAL NIGHTLY
Qty: 30 TABLET | Refills: 5 | Status: SHIPPED | OUTPATIENT
Start: 2022-06-08 | End: 2022-09-06 | Stop reason: SDUPTHER

## 2022-06-08 RX ORDER — RIZATRIPTAN BENZOATE 10 MG/1
TABLET, ORALLY DISINTEGRATING ORAL
Qty: 12 TABLET | Refills: 5 | Status: SHIPPED | OUTPATIENT
Start: 2022-06-08 | End: 2022-09-06 | Stop reason: SDUPTHER

## 2022-06-08 NOTE — PROGRESS NOTES
"Established Patient        SUBJECTIVE:  Patient ID: Casie Copeland   Chief Complaint: Follow-up    History of Present Illness:  Casie Copeland is a 26 y.o. female with a PMHx of migraine, anxiety, ADHD, kidney stone once 3 years ago who presents via clinic alone for follow-up of headaches.       06/08/2022 - Interval History:  After increasing elavil last visit, pt's Ha's became well controlled. Then sometimein February/March 2022, her Ha's worsened again. She contributes this worsening to increased stress, the passing of her father, decreased self care, and her psychiatrist moving. Denies SI. Has a good support system. Is pending a new psych visit ont he [facility]th. Ha's are now occurring 10-15/30 days per month. They last 2-6 hrs at a time.  Severity ranges 4-7/10, avg 6/10. Pt interested in botox. Answered all her questions regarding this. Will hold off due to not having all requirements at this time for insurance approval (needs to try 2+ ppxs and have >15 ha days per month x 3 months).   maxalt 10mg - resolves Ha's, needs a new refill as she has been w/o it for months.   Plan: incraese elavil 75mg/d, restart maxalt, see psychiatry, rtc 2-3 mo    05/10/2021 - Interval History:   Pt began tracking, reports 11/30 ha days, severity 4-9/10, duration 3 - 5 hours.   Elavil - no SE's  maxalt - takes 2.5 hrs on avg to resolve Ha's  Began using "headache hat go" which allows user to employ ice bag on head with a head band mechanism, pt finds this to be extremely helpful during Ha's.   Plan: increase elavil 50mg/d, increase maxalt 10mg prn, rtc 3 mo    Recommendations made at last Office Visit on 3/29/21:  - Discussed symptoms appear to be consistent with migraines. Discussed this with patient along with treatment options and patient agreed with the following plan  - ppx - start elavil  - abortive - trial maxalt  - caution w/ tpx and zonis - h/o kidney stones  - risks, benefits, and potential side effects of elavil, " "maxalt discussed   - alternative treatment options offered   - importance of healthy diet, regular exercise and sleep hygiene in the treatment of headaches    - Start tracking headaches via Migraine Samm isa on phone   - RTC in 6 wks     Treatments Tried:  Elavil - helps  maxalt - helps  otc excedrin  otc tylenol  ibuprofen    Current Medications:    Current Outpatient Medications:     NEXPLANON 68 mg Impl subdermal device, , Disp: , Rfl:     amitriptyline (ELAVIL) 75 MG tablet, Take 1 tablet (75 mg total) by mouth every evening., Disp: 30 tablet, Rfl: 5    EScitalopram oxalate (LEXAPRO) 10 MG tablet, Take 1 tablet (10 mg total) by mouth once daily., Disp: 30 tablet, Rfl: 2    fluticasone propionate (FLONASE) 50 mcg/actuation nasal spray, 2 sprays (100 mcg total) by Each Nostril route once daily. (Patient not taking: Reported on 6/8/2022), Disp: 16 g, Rfl: 5    rizatriptan (MAXALT-MLT) 10 MG disintegrating tablet, May repeat in 2 hours if needed, Disp: 12 tablet, Rfl: 5    Review of Systems - as per HPI, otherwise a balanced 10 systems review is negative.    OBJECTIVE:  Vitals:  /88   Pulse 105   Ht 4' 10" (1.473 m)   Wt 84 kg (185 lb 3 oz)   BMI 38.70 kg/m²      Physical Exam:  Constitutional: she appears well-developed and well-nourished. she is well groomed. NAD   HENT:    Head: Normocephalic and atraumatic  Eyes: Conjunctivae and EOM are normal  Musculoskeletal: Normal range of motion. No joint stiffness.   Psychiatric: Mood and affect are normal    Neuro: Patient is alert and oriented to person, place, and time. Language is intact and fluent. Speech is clear and fluent. Recent and remote memory are intact.  Normal attention and concentration.  Facial movement is symmetric. Moves all 4 extremities against gravity.      Review of Data:   Notes from neuro reviewed   Labs:  No visits with results within 3 Month(s) from this visit.   Latest known visit with results is:   Lab Visit on 11/02/2021 "   Component Date Value Ref Range Status    Total Protein 11/02/2021 7.5  6.0 - 8.4 g/dL Final    Albumin 11/02/2021 4.4  3.5 - 5.2 g/dL Final    Total Bilirubin 11/02/2021 0.2  0.1 - 1.0 mg/dL Final    AST 11/02/2021 23  15 - 46 U/L Final    ALT 11/02/2021 21  10 - 44 U/L Final    Alkaline Phosphatase 11/02/2021 124  38 - 126 U/L Final    Bilirubin, Direct 11/02/2021 0.2  0.0 - 0.3 mg/dL Final     Imaging:  No results found for this or any previous visit.  Note: I have independently reviewed any/all imaging/labs/tests and agree with the report (s) as documented.  Any discrepancies will be as noted/demarcated by free text.  JEANNINE HURST 6/8/2022    ASSESSMENT:  1. Migraine without aura and without status migrainosus, not intractable        PLAN:  - Discussed symptoms appear to be consistent with migraines. Discussed this with patient along with treatment options and patient agreed with the following plan  - ppx - increase elavil  - abortive - continue maxalt  - caution w/ tpx and zonis - h/o kidney stones  - stress - has upcoming appt w/ new psychiatrist  - track ha's  - Discussed goals of therapy are to decrease the frequency, intensity, and duration of headaches  - RTC in 2-3 mo     Orders Placed This Encounter    amitriptyline (ELAVIL) 75 MG tablet    rizatriptan (MAXALT-MLT) 10 MG disintegrating tablet       Questions and concerns were sought and answered to the patient's stated verbal satisfaction.  The patient verbalizes understanding and agreement with the above stated treatment plan.     CC: Shanelle Virk, TL Moore PA-C  [facility]   [phone]    Dr. Farah was available during today's encounter.

## 2022-06-15 NOTE — PROGRESS NOTES
"Outpatient Psychiatry Follow-Up Visit (MD/NP)    6/16/2022 11:58 AM  Casie Copeland  1995  [id number]    Clinical Status of Patient:  Outpatient (Ambulatory)    Chief Complaint:  Casie Copeland, a 26 y.o. female,who presents today for follow up of depression and anxiety.  Met with patient.        Interval History/Subjective Report/Content of Current Session:     The pt was last seen by ELI Oreilly in this department on 10/15/2021. The pt is new to me. The plan at that time:    · Diagnosis / status / interventions   Anxiety: mod: continue escitalopram 10 mg q day,  Utilizing adjunctive therapy for symptom relief; continue psychotherapy  Depression / anxiety: mild - lessening symptoms - continue escitalopram 10 mg q day, with reduction of anxiety symptoms Sleep difficulties: sleep hygiene, may take hydroxyzine 25 mg 1 tab for  sleep (previous Rx another provider) - rarely needed - amitriptyline) prophylactic for migraines)  is beneficial for sleep, sedation precautions, but less effective for migraines  - she will contact neurologist          Today the pt reports that her step-father passed away in Feb. States he lives next door to her and she has not been able to return home since his death 2/2 anxiety. She is living with her boyfriend who she states is very supportive. In addition, her apartment was damaged by [place], and she is still in the process of repairing it.    Reports that she has been out of Lexapro for the past 6 weeks. She ran out of refills. Has noticed she is more irritable and toscano lately. States her moods have been "flip floppy". Has problems with sleep onset. Takes melatonin 20 mg and Elavil 75 q hs. States she "fights it". Takes her 20-30 minutes to fall asleep. Appetite is good. Denies decreased energy and motivation and anhedonia. Anxiety is manageable.    Works overnight as a  for the [employer].     Reports that her neurologist recently increased her Elavil. Also " states she no longer takes hydroxyzine.    States she was on meds for ADHD in the past and would like to re-start the med. Will defer today 2/2 tachycardia. In addition, I would need more info before considering prescribing a stimulant. States she has her old IEPs and believes she has a psych report, and she will bring those in at her next visit.    Pt denies recurrent thoughts of death and denies SI/HI. Denies any sxs of maria antonia. Denies AVH, paranoia and delusions. No objective s/sx of psychosis or maria antonia. Denies any ASE from her psych meds.      Psychotherapy:  · Target symptoms: depression, anxiety   · Why chosen therapy is appropriate versus another modality: relevant to diagnosis, patient responds to this modality  · Outcome monitoring methods: self-report, observation  · Therapeutic intervention type: supportive psychotherapy  · Topics discussed/themes: work stress, building skills sets for symptom management  · The patient's response to the intervention is accepting. The patient's progress toward treatment goals is good.   · Duration of intervention: 10 minutes.      Psychotropic medication review  Previous Trials-  fluoxetine, Concerta (longest), Vyvanse, Datrana  Patch, Adderall,   Side effects: Adderall - made my heart race      Current meds-  escitalopram      History:         Past Medical, Family and Social History: The patient's past medical, family and social history, allergies, current medications, past surgical history, and problem list have been reviewed and updated as appropriate within the electronic medical record.    Reviewed LIYAH Haas's notes.      Review of Systems       Review of Systems   Constitutional: Negative for chills, fever and malaise/fatigue.   Respiratory: Negative for cough and shortness of breath.    Cardiovascular: Negative for chest pain and palpitations.   Gastrointestinal: Negative for abdominal pain, diarrhea and vomiting.   Genitourinary: Negative for dysuria and hematuria.    Musculoskeletal: Negative for falls and myalgias.   Skin: Negative for rash.   Neurological: Negative for tremors, seizures and headaches.   Psychiatric/Behavioral:        See HPI         Compliance: no - has been out of Lexapro x 6 weeks    Side effects: None    Risk Parameters:  Patient reports no suicidal ideation  Patient reports no homicidal ideation  Patient reports no self-injurious behavior  Patient reports no violent behavior    Exam (detailed: at least 9 elements; comprehensive: all 15 elements)     Constitutional  Vitals:  Most recent vital signs, dated less than 90 days prior to this appointment, were reviewed.   Vitals:    06/16/22 1452 06/16/22 1533   BP: 112/77    Pulse: (!) 129 108   Weight: 85.2 kg (187 lb 13.3 oz)         General:  unremarkable, age appropriate, well nourished, casually dressed, neatly groomed, obese     Musculoskeletal  Muscle Strength/Tone:  no dyskinesia, no dystonia, no tremor, no tic   Gait & Station:  non-ataxic     Psychiatric      Appearance:  unremarkable, age appropriate, well nourished, casually dressed, neatly groomed, obese   Behavior:  normal, friendly and cooperative, eye contact normal     Speech:  no latency; no press   Mood & Affect:  euthymic  congruent and appropriate   Thought Process:  normal and logical   Associations:  intact   Thought Content:  normal, no suicidality, no homicidality, delusions, or paranoia   Insight:  intact, has awareness of illness   Judgement: behavior is adequate to circumstances, age appropriate   Orientation:  grossly intact   Memory: intact for content of interview   Language: grossly intact   Attention Span & Concentration:  able to focus   Fund of Knowledge:  intact and appropriate to age and level of education       Medications:  Outpatient Encounter Medications as of 6/16/2022   Medication Sig Dispense Refill    amitriptyline (ELAVIL) 75 MG tablet Take 1 tablet (75 mg total) by mouth every evening. 30 tablet 5     EScitalopram oxalate (LEXAPRO) 10 MG tablet Take 1 tablet (10 mg total) by mouth once daily. 30 tablet 2    fluticasone propionate (FLONASE) 50 mcg/actuation nasal spray 2 sprays (100 mcg total) by Each Nostril route once daily. (Patient not taking: Reported on 6/8/2022) 16 g 5    NEXPLANON 68 mg Impl subdermal device       rizatriptan (MAXALT-MLT) 10 MG disintegrating tablet May repeat in 2 hours if needed 12 tablet 5     No facility-administered encounter medications on file as of 6/16/2022.       Allergy:  Review of patient's allergies indicates:   Allergen Reactions    Clindamycin          Assessment and Diagnosis   Status/Progress: Based on the examination today, the patient's problem(s) is/are inadequately controlled since pt ran out of her medication.  New problems have not been presented today.   Lack of compliance are complicating management of the primary condition.  There are no active rule-out diagnoses for this patient at this time.       General Impression:       ICD-10-CM ICD-9-CM   1. CLARIBEL (generalized anxiety disorder)  F41.1 300.02   2. MDD (major depressive disorder), recurrent episode, moderate  F33.1 296.32       Intervention/Counseling/Treatment Plan     · Medication Management:  · Re-start Lexapro 10 mg q day  · Continue melatonin 10 mg q hs prn insomnia  · Continue Elavil as prescribed  · Labs: reviewed most recent  · The treatment plan and follow up plan were reviewed with the patient.  · Discussed with patient informed consent, risks vs. benefits, alternative treatments, side effect profile and the inherent unpredictability of individual responses to these treatments. The patient expresses understanding of the above and displays the capacity to agree with this current plan and had no other questions.  · Encouraged Patient to keep future appointments.   · Take medications as prescribed and abstain from substance abuse.   · Pt was told to present to ED or call 911 for SI/HI plan or intent,  psychosis, or other psychiatric or medical emergency, and pt agrees to this and verbalized understanding.        Return to Clinic: 3 months, or sooner if needed        Face to Face time with patient: 33 minutes  Total time: 45 minutes of total time spent on the encounter, which includes face to face time and non-face to face time preparing to see the patient (eg, review of tests), Obtaining and/or reviewing separately obtained history, Documenting clinical information in the electronic or other health record, Independently interpreting results (not separately reported) and communicating results to the patient/family/caregiver, or Care coordination (not separately reported).       Lyla Maddox, MSN, APRN, PMHNP-BC  [facility]

## 2022-06-16 ENCOUNTER — OFFICE VISIT (OUTPATIENT)
Dept: PSYCHIATRY | Facility: CLINIC | Age: 27
End: 2022-06-16
Payer: COMMERCIAL

## 2022-06-16 VITALS
BODY MASS INDEX: 39.26 KG/M2 | HEART RATE: 108 BPM | WEIGHT: 187.81 LBS | DIASTOLIC BLOOD PRESSURE: 77 MMHG | SYSTOLIC BLOOD PRESSURE: 112 MMHG

## 2022-06-16 DIAGNOSIS — F41.1 GAD (GENERALIZED ANXIETY DISORDER): ICD-10-CM

## 2022-06-16 DIAGNOSIS — F33.1 MDD (MAJOR DEPRESSIVE DISORDER), RECURRENT EPISODE, MODERATE: ICD-10-CM

## 2022-06-16 PROCEDURE — 1159F PR MEDICATION LIST DOCUMENTED IN MEDICAL RECORD: ICD-10-PCS | Mod: CPTII,S$GLB,, | Performed by: NURSE PRACTITIONER

## 2022-06-16 PROCEDURE — 99999 PR PBB SHADOW E&M-EST. PATIENT-LVL III: CPT | Mod: PBBFAC,,, | Performed by: NURSE PRACTITIONER

## 2022-06-16 PROCEDURE — 3074F PR MOST RECENT SYSTOLIC BLOOD PRESSURE < 130 MM HG: ICD-10-PCS | Mod: CPTII,S$GLB,, | Performed by: NURSE PRACTITIONER

## 2022-06-16 PROCEDURE — 3008F BODY MASS INDEX DOCD: CPT | Mod: CPTII,S$GLB,, | Performed by: NURSE PRACTITIONER

## 2022-06-16 PROCEDURE — 3074F SYST BP LT 130 MM HG: CPT | Mod: CPTII,S$GLB,, | Performed by: NURSE PRACTITIONER

## 2022-06-16 PROCEDURE — 3078F DIAST BP <80 MM HG: CPT | Mod: CPTII,S$GLB,, | Performed by: NURSE PRACTITIONER

## 2022-06-16 PROCEDURE — 99214 OFFICE O/P EST MOD 30 MIN: CPT | Mod: S$GLB,,, | Performed by: NURSE PRACTITIONER

## 2022-06-16 PROCEDURE — 99214 PR OFFICE/OUTPT VISIT, EST, LEVL IV, 30-39 MIN: ICD-10-PCS | Mod: S$GLB,,, | Performed by: NURSE PRACTITIONER

## 2022-06-16 PROCEDURE — 3008F PR BODY MASS INDEX (BMI) DOCUMENTED: ICD-10-PCS | Mod: CPTII,S$GLB,, | Performed by: NURSE PRACTITIONER

## 2022-06-16 PROCEDURE — 1160F RVW MEDS BY RX/DR IN RCRD: CPT | Mod: CPTII,S$GLB,, | Performed by: NURSE PRACTITIONER

## 2022-06-16 PROCEDURE — 1160F PR REVIEW ALL MEDS BY PRESCRIBER/CLIN PHARMACIST DOCUMENTED: ICD-10-PCS | Mod: CPTII,S$GLB,, | Performed by: NURSE PRACTITIONER

## 2022-06-16 PROCEDURE — 3078F PR MOST RECENT DIASTOLIC BLOOD PRESSURE < 80 MM HG: ICD-10-PCS | Mod: CPTII,S$GLB,, | Performed by: NURSE PRACTITIONER

## 2022-06-16 PROCEDURE — 99999 PR PBB SHADOW E&M-EST. PATIENT-LVL III: ICD-10-PCS | Mod: PBBFAC,,, | Performed by: NURSE PRACTITIONER

## 2022-06-16 PROCEDURE — 1159F MED LIST DOCD IN RCRD: CPT | Mod: CPTII,S$GLB,, | Performed by: NURSE PRACTITIONER

## 2022-06-16 RX ORDER — ESCITALOPRAM OXALATE 10 MG/1
10 TABLET ORAL DAILY
Qty: 30 TABLET | Refills: 2 | Status: SHIPPED | OUTPATIENT
Start: 2022-06-16 | End: 2023-04-20

## 2022-07-12 ENCOUNTER — OFFICE VISIT (OUTPATIENT)
Dept: FAMILY MEDICINE | Facility: CLINIC | Age: 27
End: 2022-07-12
Payer: MEDICAID

## 2022-07-12 VITALS
HEIGHT: 58 IN | DIASTOLIC BLOOD PRESSURE: 78 MMHG | SYSTOLIC BLOOD PRESSURE: 118 MMHG | BODY MASS INDEX: 39.11 KG/M2 | HEART RATE: 103 BPM | WEIGHT: 186.31 LBS | TEMPERATURE: 98 F | OXYGEN SATURATION: 98 %

## 2022-07-12 DIAGNOSIS — F33.1 MDD (MAJOR DEPRESSIVE DISORDER), RECURRENT EPISODE, MODERATE: ICD-10-CM

## 2022-07-12 DIAGNOSIS — G43.009 MIGRAINE WITHOUT AURA AND WITHOUT STATUS MIGRAINOSUS, NOT INTRACTABLE: ICD-10-CM

## 2022-07-12 DIAGNOSIS — E66.09 CLASS 2 OBESITY DUE TO EXCESS CALORIES WITHOUT SERIOUS COMORBIDITY WITH BODY MASS INDEX (BMI) OF 38.0 TO 38.9 IN ADULT: ICD-10-CM

## 2022-07-12 DIAGNOSIS — F41.1 GENERALIZED ANXIETY DISORDER: ICD-10-CM

## 2022-07-12 DIAGNOSIS — R79.89 ELEVATED PLATELET COUNT: ICD-10-CM

## 2022-07-12 DIAGNOSIS — Z00.00 ANNUAL PHYSICAL EXAM: Primary | ICD-10-CM

## 2022-07-12 DIAGNOSIS — R73.01 IFG (IMPAIRED FASTING GLUCOSE): ICD-10-CM

## 2022-07-12 PROBLEM — E66.812 CLASS 2 OBESITY DUE TO EXCESS CALORIES WITHOUT SERIOUS COMORBIDITY WITH BODY MASS INDEX (BMI) OF 38.0 TO 38.9 IN ADULT: Status: ACTIVE | Noted: 2022-07-12

## 2022-07-12 PROCEDURE — 99395 PR PREVENTIVE VISIT,EST,18-39: ICD-10-PCS | Mod: S$PBB,,, | Performed by: NURSE PRACTITIONER

## 2022-07-12 PROCEDURE — 1159F MED LIST DOCD IN RCRD: CPT | Mod: CPTII,,, | Performed by: NURSE PRACTITIONER

## 2022-07-12 PROCEDURE — 1160F PR REVIEW ALL MEDS BY PRESCRIBER/CLIN PHARMACIST DOCUMENTED: ICD-10-PCS | Mod: CPTII,,, | Performed by: NURSE PRACTITIONER

## 2022-07-12 PROCEDURE — 3078F PR MOST RECENT DIASTOLIC BLOOD PRESSURE < 80 MM HG: ICD-10-PCS | Mod: CPTII,,, | Performed by: NURSE PRACTITIONER

## 2022-07-12 PROCEDURE — 3074F SYST BP LT 130 MM HG: CPT | Mod: CPTII,,, | Performed by: NURSE PRACTITIONER

## 2022-07-12 PROCEDURE — 99214 OFFICE O/P EST MOD 30 MIN: CPT | Mod: PBBFAC,PN | Performed by: NURSE PRACTITIONER

## 2022-07-12 PROCEDURE — 3078F DIAST BP <80 MM HG: CPT | Mod: CPTII,,, | Performed by: NURSE PRACTITIONER

## 2022-07-12 PROCEDURE — 3074F PR MOST RECENT SYSTOLIC BLOOD PRESSURE < 130 MM HG: ICD-10-PCS | Mod: CPTII,,, | Performed by: NURSE PRACTITIONER

## 2022-07-12 PROCEDURE — 3008F PR BODY MASS INDEX (BMI) DOCUMENTED: ICD-10-PCS | Mod: CPTII,,, | Performed by: NURSE PRACTITIONER

## 2022-07-12 PROCEDURE — 99999 PR PBB SHADOW E&M-EST. PATIENT-LVL IV: ICD-10-PCS | Mod: PBBFAC,,, | Performed by: NURSE PRACTITIONER

## 2022-07-12 PROCEDURE — 1159F PR MEDICATION LIST DOCUMENTED IN MEDICAL RECORD: ICD-10-PCS | Mod: CPTII,,, | Performed by: NURSE PRACTITIONER

## 2022-07-12 PROCEDURE — 99395 PREV VISIT EST AGE 18-39: CPT | Mod: S$PBB,,, | Performed by: NURSE PRACTITIONER

## 2022-07-12 PROCEDURE — 1160F RVW MEDS BY RX/DR IN RCRD: CPT | Mod: CPTII,,, | Performed by: NURSE PRACTITIONER

## 2022-07-12 PROCEDURE — 3008F BODY MASS INDEX DOCD: CPT | Mod: CPTII,,, | Performed by: NURSE PRACTITIONER

## 2022-07-12 PROCEDURE — 99999 PR PBB SHADOW E&M-EST. PATIENT-LVL IV: CPT | Mod: PBBFAC,,, | Performed by: NURSE PRACTITIONER

## 2022-07-12 NOTE — PROGRESS NOTES
Subjective:       Patient ID: Casie Copeland is a 27 y.o. female.    Chief Complaint: Annual Exam    HPI    Patient is a 27 year old white female with chronic migraines followed by [facility], reported history of ADHD and Anxiety/Depression diagnosed in first grade and treated by [facility] and Pediatric NP Valentina Infante in past and now followed by [facility] and elevated liver enzymes on wellness labs in July 2021 here today for ANNUAL PHYSICAL EXAM with fasting lab results.     Major Depression and CLARIBEL   followed and managed by [facility].  Taking Amitriptyline 75 mg at bedtime and Lexapro 10 mg daily     Migraines   followed by [facility].  On Maxalt prn abortive therapy       Elevated Platelet Count  Platelet count 474, no history of elevations in past  Will monitor and recheck in 6 months    Impaired Fasting Glucose  , no history of elevations  Significant family history of diabetes  Will work on diet and recheck in 3 months.    Obesity  Body mass index is 38.93 kg/m².     Wellness Labs:  CBC okay other than elevated platelets  CMP okay other than , kidney and liver okay  Cholesterol level within range  TSH WNL    Health Maintenance:  Advised to get covid booster at local pharmacy    Component      Latest Ref Rng & Units 7/8/2022 11/2/2021 7/1/2021 8/3/2020   WBC      3.90 - 12.70 K/uL 8.91  10.47 9.84   RBC      4.00 - 5.40 M/uL 4.78  4.45 4.18   Hemoglobin      12.0 - 16.0 g/dL 13.8  14.1 12.9   Hematocrit      37.0 - 48.5 % 42.9  41.7 38.3   MCV      82 - 98 fL 90  94 92   MCH      27.0 - 31.0 pg 28.9  31.7 (H) 30.9   MCHC      32.0 - 36.0 g/dL 32.2  33.8 33.7   RDW      11.5 - 14.5 % 15.2 (H)  13.0 12.6   Platelets      150 - 450 K/uL 474 (H)  316 252   MPV      9.2 - 12.9 fL 10.3  10.9 11.7   Immature Granulocytes      0.0 - 0.5 % 0.4  0.3    Gran # (ANC)      1.8 - 7.7 K/uL 5.9  7.8 (H)    Immature Grans (Abs)      0.00 - 0.04 K/uL 0.04   0.03    Lymph #      1.0 - 4.8 K/uL 2.3  2.0    Mono #      0.3 - 1.0 K/uL 0.4  0.5    Eos #      0.0 - 0.5 K/uL 0.2  0.1    Baso #      0.00 - 0.20 K/uL 0.03  0.03    nRBC      0 /100 WBC 0  0    Gran %      38.0 - 73.0 % 66.6  74.6 (H)    Lymph %      18.0 - 48.0 % 26.0  19.2    Mono %      4.0 - 15.0 % 4.9  4.5    Eosinophil %      0.0 - 8.0 % 1.8  1.1    Basophil %      0.0 - 1.9 % 0.3  0.3    Differential Method       Automated  Automated    Sodium      136 - 145 mmol/L 137  139 139   Potassium      3.5 - 5.1 mmol/L 4.3  4.4 3.9   Chloride      95 - 110 mmol/L 101  102 108   CO2      23 - 29 mmol/L 25  24 24   Glucose      70 - 110 mg/dL 113 (H)  85 109   BUN      7 - 17 mg/dL 8  11 9   Creatinine      0.50 - 1.40 mg/dL 0.76  0.65 0.8   Calcium      8.7 - 10.5 mg/dL 9.2  9.5 8.8   PROTEIN TOTAL      6.0 - 8.4 g/dL 8.4 7.5 7.9 7.0   Albumin      3.5 - 5.2 g/dL 4.6 4.4 4.4 4.2   BILIRUBIN TOTAL      0.1 - 1.0 mg/dL 0.7 0.2 0.3 0.3   Alkaline Phosphatase      38 - 126 U/L 138 (H) 124 115 86   AST      15 - 46 U/L 26 23 58 (H) 17   ALT      10 - 44 U/L 26 21 21 14   Anion Gap      8 - 16 mmol/L 11  13 7 (L)   eGFR if African American      >60 mL/min/1.73 m:2 >60.0  >60.0 >60   eGFR if non African American      >60 mL/min/1.73 m:2 >60.0  >60.0 >60   Cholesterol      120 - 199 mg/dL 154  146    Triglycerides      30 - 150 mg/dL 77  38    HDL      40 - 75 mg/dL 46  60    LDL Cholesterol External      63.0 - 159.0 mg/dL 92.6  78.4    HDL/Cholesterol Ratio      20.0 - 50.0 % 29.9  41.1    Total Cholesterol/HDL Ratio      2.0 - 5.0 3.3  2.4    Non-HDL Cholesterol      mg/dL 108  86    TSH      0.400 - 4.000 uIU/mL 0.628  1.000         Review of Systems   Constitutional: Negative for appetite change, chills, fatigue, fever and unexpected weight change.   HENT: Negative for congestion, ear pain, mouth sores, nosebleeds, postnasal drip, rhinorrhea, sinus pressure, sneezing, sore throat, trouble swallowing and voice change.  "   Eyes: Negative for photophobia, pain, discharge, redness, itching and visual disturbance.   Respiratory: Negative for cough, chest tightness and shortness of breath.    Cardiovascular: Negative for chest pain, palpitations and leg swelling.   Gastrointestinal: Negative for abdominal pain, blood in stool, constipation, diarrhea, nausea and vomiting.   Genitourinary: Negative for dysuria, frequency, hematuria and urgency.   Musculoskeletal: Negative for arthralgias, back pain, joint swelling and myalgias.   Skin: Negative for color change and rash.   Allergic/Immunologic: Negative for immunocompromised state.   Neurological: Negative for dizziness, seizures, syncope, weakness and headaches.   Hematological: Negative for adenopathy. Does not bruise/bleed easily.   Psychiatric/Behavioral: Negative for agitation, dysphoric mood, sleep disturbance and suicidal ideas. The patient is not nervous/anxious.          Objective:     Vitals:    07/12/22 1442   BP: 118/78   BP Location: Left arm   Patient Position: Sitting   BP Method: Large (Manual)   Pulse: 103   Temp: 98.3 °F (36.8 °C)   TempSrc: Oral   SpO2: 98%   Weight: 84.5 kg (186 lb 4.6 oz)   Height: 4' 10" (1.473 m)          Physical Exam  Constitutional:       General: She is not in acute distress.     Appearance: Normal appearance. She is well-developed. She is obese. She is not ill-appearing, toxic-appearing or diaphoretic.      Comments: Body mass index is 38.93 kg/m².   HENT:      Head: Normocephalic and atraumatic.      Right Ear: Tympanic membrane, ear canal and external ear normal.      Left Ear: Tympanic membrane, ear canal and external ear normal.      Nose: Nose normal.      Mouth/Throat:      Pharynx: No oropharyngeal exudate.   Eyes:      General:         Right eye: No discharge.         Left eye: No discharge.      Extraocular Movements: Extraocular movements intact.      Conjunctiva/sclera: Conjunctivae normal.   Neck:      Thyroid: No thyromegaly.      " Trachea: No tracheal deviation.   Cardiovascular:      Rate and Rhythm: Normal rate and regular rhythm.      Heart sounds: Normal heart sounds. No murmur heard.  Pulmonary:      Effort: Pulmonary effort is normal. No respiratory distress.      Breath sounds: Normal breath sounds.   Abdominal:      General: There is no distension.      Palpations: Abdomen is soft. There is no mass.      Tenderness: There is no abdominal tenderness. There is no guarding.      Hernia: No hernia is present.   Musculoskeletal:         General: No swelling or deformity. Normal range of motion.      Cervical back: Normal range of motion and neck supple.      Right lower leg: No edema.      Left lower leg: No edema.   Lymphadenopathy:      Cervical: No cervical adenopathy.   Skin:     General: Skin is warm and dry.      Findings: No rash.   Neurological:      Mental Status: She is alert and oriented to person, place, and time.      Cranial Nerves: No cranial nerve deficit.      Coordination: Coordination normal.   Psychiatric:         Mood and Affect: Mood normal.         Behavior: Behavior normal.         Thought Content: Thought content normal.         Judgment: Judgment normal.           Assessment:         ICD-10-CM ICD-9-CM   1. Annual physical exam  Z00.00 V70.0   2. MDD (major depressive disorder), recurrent episode, moderate  F33.1 296.32   3. Generalized anxiety disorder  F41.1 300.02   4. Migraine without aura and without status migrainosus, not intractable  G43.009 346.10   5. Class 2 obesity due to excess calories without serious comorbidity with body mass index (BMI) of 38.0 to 38.9 in adult  E66.09 278.00    Z68.38 V85.38   6. IFG (impaired fasting glucose)  R73.01 790.21   7. Elevated platelet count  R79.89 790.6       Plan:       Annual physical exam    Health Maintenance Summary     Full History      Expand All  Collapse All    Overdue - COVID-19 Vaccine  (3 - Booster for Pfizer series)  Overdue since 12/6/2021 07/06/2021   Imm Admin: COVID-19, MRNA, LN-S, PF (Pfizer) (Purple Cap)    06/15/2021  Imm Admin: COVID-19, MRNA, LN-S, PF (Pfizer) (Purple Cap)      Influenza Vaccine  (1)  Next due on 9/1/2022  10/01/2009  Imm Admin: Influenza      Pap Smear  (Every 3 Years)  Next due on 10/13/2023  10/13/2020  Liquid-Based Pap Smear, Screening      TETANUS VACCINE  (Every 10 Years)  Next due on 11/4/2031 11/04/2021  Imm Admin: Tdap    09/30/2008  Imm Admin: Tdap      HPV Vaccines  (Series Information)  Completed  10/13/2020  Imm Admin: HPV 9-Valent    11/04/2013  Imm Admin: HPV Quadrivalent    05/24/2012  Imm Admin: HPV Quadrivalent    05/26/2011  Imm Admin: HPV Quadrivalent      Hepatitis C Screening  Completed  07/01/2021  Hepatitis C Ab component of Hepatitis C Antibody      HIV Screening  Completed  07/01/2021  HIV 1/2 Ag/Ab (4th Gen)      Lipid Panel  Completed  07/08/2022  Lipid Panel    07/01/2021  Lipid Panel      Discontinued - Pneumococcal Vaccines (Age 0-64)    Discontinued  No completion history exists for this topic.         MDD (major depressive disorder), recurrent episode, moderate  Followed by psychiatry    Generalized anxiety disorder    Migraine without aura and without status migrainosus, not intractable  Followed by Neurology    Class 2 obesity due to excess calories without serious comorbidity with body mass index (BMI) of 38.0 to 38.9 in adult    IFG (impaired fasting glucose)  Decrease sugars in diet and recheck in 3 months.  -     Comprehensive Metabolic Panel; Future; Expected date: 07/12/2022  -     Hemoglobin A1C; Future; Expected date: 07/12/2022    Elevated platelet count  Will monitor and recheck in 3 months.  -     CBC Auto Differential; Future; Expected date: 07/12/2022      Follow up in about 6 months (around 1/12/2023) for fasting labs and follow up.     Patient's Medications   New Prescriptions    No medications on file   Previous Medications    AMITRIPTYLINE (ELAVIL) 75 MG TABLET    Take 1 tablet (75 mg  total) by mouth every evening.    ESCITALOPRAM OXALATE (LEXAPRO) 10 MG TABLET    Take 1 tablet (10 mg total) by mouth once daily.    FLUTICASONE PROPIONATE (FLONASE) 50 MCG/ACTUATION NASAL SPRAY    2 sprays (100 mcg total) by Each Nostril route once daily.    NEXPLANON 68 MG IMPL SUBDERMAL DEVICE        RIZATRIPTAN (MAXALT-MLT) 10 MG DISINTEGRATING TABLET    May repeat in 2 hours if needed   Modified Medications    No medications on file   Discontinued Medications    No medications on file       Past Medical History:   Diagnosis Date    ADHD (attention deficit hyperactivity disorder)     diagnosed around 1st grade but does not remember where; was treated by [facility] and Valentina Infante Pediatric NP in past - off meds since age 19    Anxiety     Depression     treated by [facility] and Valentina Infante pediatric NP in past - off all meds since age 19    History of psychiatric hospitalization     Hx of psychiatric care     Migraine without aura 10/13/2020    Psychiatric exam requested by [facility] school    Psychiatric problem     Therapy        Past Surgical History:   Procedure Laterality Date    TONSILLECTOMY         Family History   Problem Relation Age of Onset    Colon cancer Maternal Grandfather 73    Cancer Maternal Grandfather     Alcohol abuse Maternal Grandfather     Alzheimer's disease Maternal Grandfather     Colon cancer Maternal Aunt         great aunt    Alcohol abuse Maternal Aunt     Cancer Paternal Grandfather     Depression Mother     Migraines Mother     Depression Sister     Migraines Sister     Diabetes Father     Diabetes Sister     Diabetes Paternal Grandmother     Breast cancer Neg Hx     Ovarian cancer Neg Hx        Social History     Socioeconomic History    Marital status: Single    Number of children: 0    Years of education: 12    Highest education level: High school graduate   Occupational History     Occupation:  for [employer]   Tobacco Use    Smoking status: Former Smoker     Packs/day: 2.00     Years: 3.00     Pack years: 6.00     Types: Vaping with nicotine     Quit date: 2014     Years since quittin.4    Smokeless tobacco: Never Used    Tobacco comment: smoked cigarettes 2 ppd for 3 days and now vapes with nicotine   Substance and Sexual Activity    Alcohol use: Yes     Comment: maybe 1 glass of wine per month    Drug use: Never    Sexual activity: Yes     Partners: Male     Birth control/protection: Implant   Other Topics Concern    Patient feels they ought to cut down on drinking/drug use No    Patient annoyed by others criticizing their drinking/drug use No    Patient has felt bad or guilty about drinking/drug use No    Patient has had a drink/used drugs as an eye opener in the AM No

## 2022-07-15 ENCOUNTER — OFFICE VISIT (OUTPATIENT)
Dept: PSYCHIATRY | Facility: CLINIC | Age: 27
End: 2022-07-15
Payer: COMMERCIAL

## 2022-07-15 DIAGNOSIS — F43.21 UNRESOLVED GRIEF: ICD-10-CM

## 2022-07-15 DIAGNOSIS — F41.1 GAD (GENERALIZED ANXIETY DISORDER): Primary | ICD-10-CM

## 2022-07-15 PROCEDURE — 90834 PSYTX W PT 45 MINUTES: CPT | Mod: S$GLB,,, | Performed by: CASE MANAGER/CARE COORDINATOR

## 2022-07-15 PROCEDURE — 90834 PR PSYCHOTHERAPY W/PATIENT, 45 MIN: ICD-10-PCS | Mod: S$GLB,,, | Performed by: CASE MANAGER/CARE COORDINATOR

## 2022-07-15 NOTE — PROGRESS NOTES
Individual Psychotherapy (PhD/LCSW)    7/15/2022    Site:  [facility]    Therapeutic Intervention: Met with patient.  Outpatient - Supportive psychotherapy 45 min - CPT Code 78541    Chief complaint/reason for encounter: depression, anxiety and interpersonal     Interval history and content of current session: Pt was last seen by writer on 6/3/22. Pt reports she has moved back in her home because her boyfriend has now moved in with her. Pt reports anxiety and lack of motivation to unpack boxes because her father's things are in there. Pt reports she's been stressed at work due to coworkers being out. Pt reports her relationship is going well with her boyfriend and he's encouraged to share and explore her feelings more. Pt had a medical episode that was worrisome with grandmother but pt states it helped to distract her from the first father's day without her dad. Pt often laughs during sessions, even when talking about sad or upsetting situations as her way of coping. Pt is still having difficulty processing her grief with the loss of her father. Pt states she's been working on improving herself.    Treatment plan:  · Target symptoms: adjustment, grief, work stress  · Why chosen therapy is appropriate versus another modality: relevant to diagnosis, patient responds to this modality, evidence based practice  · Outcome monitoring methods: self-report  · Therapeutic intervention type: supportive psychotherapy    Risk parameters:  Patient reports no suicidal ideation  Patient reports no homicidal ideation  Patient reports no self-injurious behavior  Patient reports no violent behavior    Verbal deficits: None    Patient's response to intervention:  The patient's response to intervention is accepting. Pt is forthcoming sharing information and feelings with staff.    Progress toward goals and other mental status changes:  The patient's progress toward goals is fair , limited. Pt continues to avoid processing  her grief about her father.     Diagnosis:   No diagnosis found.    Plan:  individual psychotherapy Staff will continue to work with pt to process grief. Staff will work with pt to continue developing healthy coping skills.    Return to clinic: 1 month, as scheduled, as needed    Length of Service (minutes): 60

## 2022-08-12 ENCOUNTER — OFFICE VISIT (OUTPATIENT)
Dept: PSYCHIATRY | Facility: CLINIC | Age: 27
End: 2022-08-12
Payer: MEDICAID

## 2022-08-12 DIAGNOSIS — F43.21 UNRESOLVED GRIEF: ICD-10-CM

## 2022-08-12 DIAGNOSIS — F32.A ANXIETY AND DEPRESSION: ICD-10-CM

## 2022-08-12 DIAGNOSIS — F41.1 GAD (GENERALIZED ANXIETY DISORDER): Primary | ICD-10-CM

## 2022-08-12 DIAGNOSIS — F33.1 MDD (MAJOR DEPRESSIVE DISORDER), RECURRENT EPISODE, MODERATE: ICD-10-CM

## 2022-08-12 DIAGNOSIS — F41.9 ANXIETY AND DEPRESSION: ICD-10-CM

## 2022-08-12 PROCEDURE — 90834 PSYTX W PT 45 MINUTES: CPT | Mod: S$GLB,,, | Performed by: CASE MANAGER/CARE COORDINATOR

## 2022-08-12 PROCEDURE — 90834 PR PSYCHOTHERAPY W/PATIENT, 45 MIN: ICD-10-PCS | Mod: S$GLB,,, | Performed by: CASE MANAGER/CARE COORDINATOR

## 2022-08-12 NOTE — PROGRESS NOTES
Individual Psychotherapy (PhD/LCSW)    8/12/2022    Site:  [facility]placed by [facility]    Therapeutic Intervention: Met with patient.  Outpatient - Supportive psychotherapy 45 min - CPT Code 65716    Chief complaint/reason for encounter: anxiety and interpersonal     Interval history and content of current session: Pt was last seen by writer on 7/15/22. Pt reports she has been stressed due to her cat playing with her medications and losing them. She has not had her medications for 1 1/2 weeks, however, plans to pick them up and restart them today. She states she notices she does much better with less anxiety and depression on the medications. Pt reports her car has been having issues which causes stress. She states there have been multiple deaths/people quitting at he company which puts work stress on others. Pt reports she's doing better with her grief, that while she has moments, it does not consume her. She reports she's no longer keeping her feelings bottled up. She states her boyfriend gives her time to process and then makes her talk about her feelings. She states he also helps around the home and puts the same energy she does into a relationship and she appreciates that. She states one of her cats and her rat is sick. She does plan to take the cat to the vet soon. Overall, pt appears to be improving and has bright affect during this session.    Treatment plan:  · Target symptoms: anxiety , adjustment, grief, work stress  · Why chosen therapy is appropriate versus another modality: relevant to diagnosis, patient responds to this modality, evidence based practice  · Outcome monitoring methods: self-report, observation  · Therapeutic intervention type: supportive psychotherapy    Risk parameters:  Patient reports no suicidal ideation  Patient reports no homicidal ideation  Patient reports no self-injurious behavior  Patient reports no violent behavior    Verbal deficits: None    Patient's response to  intervention:  The patient's response to intervention is accepting. Pt was very forthcoming and open with staff during session.    Progress toward goals and other mental status changes:  The patient's progress toward goals is good. Pt reports her boyfriend has helped her to talk more about her feelings, she feels less stressed and overwhelmed.    Diagnosis:   No diagnosis found.    Plan:  individual psychotherapy Staff will continue to work with pt to process grief, improve self esteem, increase self care and develop healthy coping skills.    Return to clinic: 1 month, as scheduled, as needed    Length of Service (minutes): 60

## 2022-09-02 ENCOUNTER — OFFICE VISIT (OUTPATIENT)
Dept: PSYCHIATRY | Facility: CLINIC | Age: 27
End: 2022-09-02
Payer: COMMERCIAL

## 2022-09-02 DIAGNOSIS — F41.1 GAD (GENERALIZED ANXIETY DISORDER): Primary | ICD-10-CM

## 2022-09-02 DIAGNOSIS — F43.21 UNRESOLVED GRIEF: ICD-10-CM

## 2022-09-02 DIAGNOSIS — F33.1 MDD (MAJOR DEPRESSIVE DISORDER), RECURRENT EPISODE, MODERATE: ICD-10-CM

## 2022-09-02 PROCEDURE — 90834 PR PSYCHOTHERAPY W/PATIENT, 45 MIN: ICD-10-PCS | Mod: S$GLB,,, | Performed by: CASE MANAGER/CARE COORDINATOR

## 2022-09-02 PROCEDURE — 90834 PSYTX W PT 45 MINUTES: CPT | Mod: S$GLB,,, | Performed by: CASE MANAGER/CARE COORDINATOR

## 2022-09-02 NOTE — PROGRESS NOTES
Individual Psychotherapy (PhD/LCSW)    9/2/2022    Site:  [facility]    Therapeutic Intervention: Met with patient.  Outpatient - Supportive psychotherapy 45 min - CPT Code 72951    Chief complaint/reason for encounter: depression, anxiety, and interpersonal     Interval history and content of current session: Pt was last seen by writer on 8/12/22. Pt reports she has been doing much better. She states she has been seeing her psychiatrist and taking her medications more regularly which make her feel more stable. She states she has been talking about her feelings more with her boyfriend instead of keeping them in and their relationship is going well. She states she was able to take her sick cat that she has been worried about to the vet and is awaiting results. Pt reports she is still avoiding unboxing things in her home. Pt still reports stress at work due to constant call outs, people being sick, being short staffed, and schedule changes. Pt states her boyfriend has noticed that she does apologize often, defend herself and often look for validation. Pt relates this to past trauma.    Treatment plan:  Target symptoms: depression, anxiety , adjustment, grief, work stress  Why chosen therapy is appropriate versus another modality: relevant to diagnosis, patient responds to this modality, evidence based practice  Outcome monitoring methods: self-report, observation  Therapeutic intervention type: supportive psychotherapy    Risk parameters:  Patient reports no suicidal ideation  Patient reports no homicidal ideation  Patient reports no self-injurious behavior  Patient reports no violent behavior    Verbal deficits: None    Patient's response to intervention:  The patient's response to intervention is accepting. Pt is forthcoming through most parts of session. Pt does use intellect and humor to cope with more difficult topics.    Progress toward goals and other mental status changes:  The patient's progress  toward goals is limited. Pt reports she has been taking medications more regularly and talking about her feelings.    Diagnosis:   No diagnosis found.    Plan:  individual psychotherapy Staff will continue to work with pt to process stress/trauma/grief, develop healthy coping skills, and to manage anxiety and depresion.    Return to clinic: 1 month, as scheduled, as needed    Length of Service (minutes): 60

## 2022-09-06 ENCOUNTER — OFFICE VISIT (OUTPATIENT)
Dept: NEUROLOGY | Facility: CLINIC | Age: 27
End: 2022-09-06
Payer: MEDICAID

## 2022-09-06 VITALS
SYSTOLIC BLOOD PRESSURE: 117 MMHG | DIASTOLIC BLOOD PRESSURE: 79 MMHG | WEIGHT: 205 LBS | HEIGHT: 58 IN | HEART RATE: 118 BPM | BODY MASS INDEX: 43.03 KG/M2

## 2022-09-06 DIAGNOSIS — F41.1 GENERALIZED ANXIETY DISORDER: ICD-10-CM

## 2022-09-06 DIAGNOSIS — Z87.442 HISTORY OF KIDNEY STONES: ICD-10-CM

## 2022-09-06 DIAGNOSIS — G43.009 MIGRAINE WITHOUT AURA AND WITHOUT STATUS MIGRAINOSUS, NOT INTRACTABLE: Primary | ICD-10-CM

## 2022-09-06 PROCEDURE — 3074F SYST BP LT 130 MM HG: CPT | Mod: CPTII,,, | Performed by: PHYSICIAN ASSISTANT

## 2022-09-06 PROCEDURE — 1159F PR MEDICATION LIST DOCUMENTED IN MEDICAL RECORD: ICD-10-PCS | Mod: CPTII,,, | Performed by: PHYSICIAN ASSISTANT

## 2022-09-06 PROCEDURE — 99213 OFFICE O/P EST LOW 20 MIN: CPT | Mod: PBBFAC | Performed by: PHYSICIAN ASSISTANT

## 2022-09-06 PROCEDURE — 99214 OFFICE O/P EST MOD 30 MIN: CPT | Mod: S$PBB,,, | Performed by: PHYSICIAN ASSISTANT

## 2022-09-06 PROCEDURE — 99214 PR OFFICE/OUTPT VISIT, EST, LEVL IV, 30-39 MIN: ICD-10-PCS | Mod: S$PBB,,, | Performed by: PHYSICIAN ASSISTANT

## 2022-09-06 PROCEDURE — 1160F RVW MEDS BY RX/DR IN RCRD: CPT | Mod: CPTII,,, | Performed by: PHYSICIAN ASSISTANT

## 2022-09-06 PROCEDURE — 3008F BODY MASS INDEX DOCD: CPT | Mod: CPTII,,, | Performed by: PHYSICIAN ASSISTANT

## 2022-09-06 PROCEDURE — 3078F PR MOST RECENT DIASTOLIC BLOOD PRESSURE < 80 MM HG: ICD-10-PCS | Mod: CPTII,,, | Performed by: PHYSICIAN ASSISTANT

## 2022-09-06 PROCEDURE — 1159F MED LIST DOCD IN RCRD: CPT | Mod: CPTII,,, | Performed by: PHYSICIAN ASSISTANT

## 2022-09-06 PROCEDURE — 3008F PR BODY MASS INDEX (BMI) DOCUMENTED: ICD-10-PCS | Mod: CPTII,,, | Performed by: PHYSICIAN ASSISTANT

## 2022-09-06 PROCEDURE — 3078F DIAST BP <80 MM HG: CPT | Mod: CPTII,,, | Performed by: PHYSICIAN ASSISTANT

## 2022-09-06 PROCEDURE — 99999 PR PBB SHADOW E&M-EST. PATIENT-LVL III: CPT | Mod: PBBFAC,,, | Performed by: PHYSICIAN ASSISTANT

## 2022-09-06 PROCEDURE — 1160F PR REVIEW ALL MEDS BY PRESCRIBER/CLIN PHARMACIST DOCUMENTED: ICD-10-PCS | Mod: CPTII,,, | Performed by: PHYSICIAN ASSISTANT

## 2022-09-06 PROCEDURE — 3074F PR MOST RECENT SYSTOLIC BLOOD PRESSURE < 130 MM HG: ICD-10-PCS | Mod: CPTII,,, | Performed by: PHYSICIAN ASSISTANT

## 2022-09-06 PROCEDURE — 99999 PR PBB SHADOW E&M-EST. PATIENT-LVL III: ICD-10-PCS | Mod: PBBFAC,,, | Performed by: PHYSICIAN ASSISTANT

## 2022-09-06 RX ORDER — AMITRIPTYLINE HYDROCHLORIDE 75 MG/1
75 TABLET ORAL NIGHTLY
Qty: 30 TABLET | Refills: 5 | Status: SHIPPED | OUTPATIENT
Start: 2022-09-06 | End: 2022-11-28 | Stop reason: SDUPTHER

## 2022-09-06 RX ORDER — RIZATRIPTAN BENZOATE 10 MG/1
TABLET, ORALLY DISINTEGRATING ORAL
Qty: 12 TABLET | Refills: 5 | Status: SHIPPED | OUTPATIENT
Start: 2022-09-06 | End: 2022-11-28 | Stop reason: SDUPTHER

## 2022-09-06 RX ORDER — DICLOFENAC SODIUM 75 MG/1
75 TABLET, DELAYED RELEASE ORAL 2 TIMES DAILY PRN
Qty: 20 TABLET | Refills: 5 | Status: SHIPPED | OUTPATIENT
Start: 2022-09-06 | End: 2022-11-28 | Stop reason: SDUPTHER

## 2022-09-06 RX ORDER — PROPRANOLOL HYDROCHLORIDE 60 MG/1
60 CAPSULE, EXTENDED RELEASE ORAL DAILY
Qty: 30 CAPSULE | Refills: 5 | Status: SHIPPED | OUTPATIENT
Start: 2022-09-06 | End: 2022-11-28

## 2022-09-06 NOTE — PROGRESS NOTES
Established Patient        SUBJECTIVE:  Patient ID: Casie Copeland   Chief Complaint: Follow-up    History of Present Illness:  aCsie Copeland is a 27 y.o. female with a PMHx of migraine, anxiety, ADHD, kidney stone once 3 years ago who presents via clinic alone for follow-up of headaches.       09/06/2022 - Interval History:  HA's have continued to worsen. Are now occurring 20-25/30 days per month with 1 debilitating HA over the last 3 months. Durations 1-5 hours. Pt was seen by psychiatry and started on lexapro which pt feels has not helped yet. Does endorse increased stress at this time with subsequent hypersomnolence and increased HR. Denies depression.  Maxalt - now takes repeated dosings to help  Discussed multiple options. Pt agreeable w/ following. Interested in botox.   Plan: start propranolol 60mg/d (d/c if SE's or depression occurs), continue elavil 75mg/d, continue maxalt, try diclofenac as 2nd line option, continue mgmt per psychiatry, rtc in 2-3 mo or sooner if needed    06/08/2022 - interval History:   After increasing elavil last visit, pt's Ha's became well controlled. Then sometimein February/March 2022, her Ha's worsened again. She contributes this worsening to increased stress, the passing of her father, decreased self care, and her psychiatrist moving. Denies SI. Has a good support system. Is pending a new psych visit [facility]. Ha's are now occurring 10-15/30 days per month. They last 2-6 hrs at a time.  Severity ranges 4-7/10, avg 6/10. Pt interested in botox. Answered all her questions regarding this. Will hold off due to not having all requirements at this time for insurance approval (needs to try 2+ ppxs and have >15 ha days per month x 3 months).   maxalt 10mg - resolves Ha's, needs a new refill as she has been w/o it for months.   Plan: incraese elavil 75mg/d, restart maxalt, see psychiatry, rtc 2-3 mo    05/10/2021 - Interval History:   Pt began tracking, reports 11/30 ha  "days, severity 4-9/10, duration 3 - 5 hours.   Elavil - no SE's  maxalt - takes 2.5 hrs on avg to resolve Ha's  Began using "headache hat go" which allows user to employ ice bag on head with a head band mechanism, pt finds this to be extremely helpful during Ha's.   Plan: increase elavil 50mg/d, increase maxalt 10mg prn, rtc 3 mo    Recommendations made at last Office Visit on 3/29/21:  - Discussed symptoms appear to be consistent with migraines. Discussed this with patient along with treatment options and patient agreed with the following plan  - ppx - start elavil  - abortive - trial maxalt  - caution w/ tpx and zonis - h/o kidney stones  - risks, benefits, and potential side effects of elavil, maxalt discussed   - alternative treatment options offered   - importance of healthy diet, regular exercise and sleep hygiene in the treatment of headaches    - Start tracking headaches via Migraine Buddy isa on phone   - RTC in 6 wks     Treatments Tried:  Elavil - helps  Tpx/zonisamide - avoid 2/2 h/o kideny stones  maxalt - helps  otc excedrin  otc tylenol  Ibuprofen 800mg  Aleve    Current Medications:    Current Outpatient Medications:     EScitalopram oxalate (LEXAPRO) 10 MG tablet, Take 1 tablet (10 mg total) by mouth once daily., Disp: 30 tablet, Rfl: 2    fluticasone propionate (FLONASE) 50 mcg/actuation nasal spray, 2 sprays (100 mcg total) by Each Nostril route once daily., Disp: 16 g, Rfl: 5    NEXPLANON 68 mg Impl subdermal device, , Disp: , Rfl:     amitriptyline (ELAVIL) 75 MG tablet, Take 1 tablet (75 mg total) by mouth every evening., Disp: 30 tablet, Rfl: 5    diclofenac (VOLTAREN) 75 MG EC tablet, Take 1 tablet (75 mg total) by mouth 2 (two) times daily as needed (for migraines). Do not exceed 2-3 days in a week., Disp: 20 tablet, Rfl: 5    propranoloL (INDERAL LA) 60 MG 24 hr capsule, Take 1 capsule (60 mg total) by mouth once daily., Disp: 30 capsule, Rfl: 5    rizatriptan (MAXALT-MLT) 10 MG " "disintegrating tablet, May repeat in 2 hours if needed, Disp: 12 tablet, Rfl: 5    Review of Systems - as per HPI, otherwise a balanced 10 systems review is negative.    OBJECTIVE:  Vitals:  /79   Pulse (!) 118   Ht 4' 10" (1.473 m)   Wt 93 kg (205 lb)   BMI 42.85 kg/m²      Physical Exam:  Constitutional: she appears well-developed and well-nourished. she is well groomed. NAD   HENT:    Head: Normocephalic and atraumatic  Eyes: Conjunctivae and EOM are normal  Musculoskeletal: Normal range of motion. No joint stiffness.   Psychiatric: Mood and affect are normal    Neuro: Patient is alert and oriented to person, place, and time. Language is intact and fluent. Speech is clear and fluent. Recent and remote memory are intact.  Normal attention and concentration.  Facial movement is symmetric. Moves all 4 extremities against gravity.      Review of Data:   Notes from neuro reviewed   Labs:  Lab Visit on 07/08/2022   Component Date Value Ref Range Status    WBC 07/08/2022 8.91  3.90 - 12.70 K/uL Final    RBC 07/08/2022 4.78  4.00 - 5.40 M/uL Final    Hemoglobin 07/08/2022 13.8  12.0 - 16.0 g/dL Final    Hematocrit 07/08/2022 42.9  37.0 - 48.5 % Final    MCV 07/08/2022 90  82 - 98 fL Final    MCH 07/08/2022 28.9  27.0 - 31.0 pg Final    MCHC 07/08/2022 32.2  32.0 - 36.0 g/dL Final    RDW 07/08/2022 15.2 (H)  11.5 - 14.5 % Final    Platelets 07/08/2022 474 (H)  150 - 450 K/uL Final    MPV 07/08/2022 10.3  9.2 - 12.9 fL Final    Immature Granulocytes 07/08/2022 0.4  0.0 - 0.5 % Final    Gran # (ANC) 07/08/2022 5.9  1.8 - 7.7 K/uL Final    Immature Grans (Abs) 07/08/2022 0.04  0.00 - 0.04 K/uL Final    Lymph # 07/08/2022 2.3  1.0 - 4.8 K/uL Final    Mono # 07/08/2022 0.4  0.3 - 1.0 K/uL Final    Eos # 07/08/2022 0.2  0.0 - 0.5 K/uL Final    Baso # 07/08/2022 0.03  0.00 - 0.20 K/uL Final    nRBC 07/08/2022 0  0 /100 WBC Final    Gran % 07/08/2022 66.6  38.0 - 73.0 % Final    Lymph % 07/08/2022 26.0  18.0 - 48.0 % " Final    Mono % 07/08/2022 4.9  4.0 - 15.0 % Final    Eosinophil % 07/08/2022 1.8  0.0 - 8.0 % Final    Basophil % 07/08/2022 0.3  0.0 - 1.9 % Final    Differential Method 07/08/2022 Automated   Final    Sodium 07/08/2022 137  136 - 145 mmol/L Final    Potassium 07/08/2022 4.3  3.5 - 5.1 mmol/L Final    Chloride 07/08/2022 101  95 - 110 mmol/L Final    CO2 07/08/2022 25  23 - 29 mmol/L Final    Glucose 07/08/2022 113 (H)  70 - 110 mg/dL Final    BUN 07/08/2022 8  7 - 17 mg/dL Final    Creatinine 07/08/2022 0.76  0.50 - 1.40 mg/dL Final    Calcium 07/08/2022 9.2  8.7 - 10.5 mg/dL Final    Total Protein 07/08/2022 8.4  6.0 - 8.4 g/dL Final    Albumin 07/08/2022 4.6  3.5 - 5.2 g/dL Final    Total Bilirubin 07/08/2022 0.7  0.1 - 1.0 mg/dL Final    Alkaline Phosphatase 07/08/2022 138 (H)  38 - 126 U/L Final    AST 07/08/2022 26  15 - 46 U/L Final    ALT 07/08/2022 26  10 - 44 U/L Final    Anion Gap 07/08/2022 11  8 - 16 mmol/L Final    eGFR if African American 07/08/2022 >60.0  >60 mL/min/1.73 m^2 Final    eGFR if non African American 07/08/2022 >60.0  >60 mL/min/1.73 m^2 Final    Cholesterol 07/08/2022 154  120 - 199 mg/dL Final    Triglycerides 07/08/2022 77  30 - 150 mg/dL Final    HDL 07/08/2022 46  40 - 75 mg/dL Final    LDL Cholesterol 07/08/2022 92.6  63.0 - 159.0 mg/dL Final    HDL/Cholesterol Ratio 07/08/2022 29.9  20.0 - 50.0 % Final    Total Cholesterol/HDL Ratio 07/08/2022 3.3  2.0 - 5.0 Final    Non-HDL Cholesterol 07/08/2022 108  mg/dL Final    TSH 07/08/2022 0.628  0.400 - 4.000 uIU/mL Final     Imaging:  No results found for this or any previous visit.  Note: I have independently reviewed any/all imaging/labs/tests and agree with the report (s) as documented.  Any discrepancies will be as noted/demarcated by free text.  JEANNINE HURST 9/6/2022    ASSESSMENT:  1. Migraine without aura and without status migrainosus, not intractable    2. Generalized anxiety disorder    3. History of kidney stones           PLAN:  - Discussed symptoms appear to be consistent with migraines. Discussed this with patient along with treatment options and patient agreed with the following plan  - ppx - start propranolol, continue elavil  - abortive - continue maxalt, try diclofenac  - caution w/ tpx and zonis - h/o kidney stones  - stress - has upcoming appt w/ new psychiatrist  - track ha's  - Discussed goals of therapy are to decrease the frequency, intensity, and duration of headaches  - RTC in 2-3 mo     Orders Placed This Encounter    diclofenac (VOLTAREN) 75 MG EC tablet    propranoloL (INDERAL LA) 60 MG 24 hr capsule    rizatriptan (MAXALT-MLT) 10 MG disintegrating tablet    amitriptyline (ELAVIL) 75 MG tablet         Questions and concerns were sought and answered to the patient's stated verbal satisfaction.  The patient verbalizes understanding and agreement with the above stated treatment plan.     CC: TL Alcocer PA-C  [facility] [place]   [phone]    Dr. Farah was available during today's encounter.

## 2022-10-06 ENCOUNTER — PATIENT MESSAGE (OUTPATIENT)
Dept: BARIATRICS | Facility: CLINIC | Age: 27
End: 2022-10-06
Payer: COMMERCIAL

## 2022-11-28 ENCOUNTER — OFFICE VISIT (OUTPATIENT)
Dept: NEUROLOGY | Facility: CLINIC | Age: 27
End: 2022-11-28
Payer: MEDICAID

## 2022-11-28 DIAGNOSIS — G43.009 MIGRAINE WITHOUT AURA AND WITHOUT STATUS MIGRAINOSUS, NOT INTRACTABLE: Primary | ICD-10-CM

## 2022-11-28 PROCEDURE — 1160F PR REVIEW ALL MEDS BY PRESCRIBER/CLIN PHARMACIST DOCUMENTED: ICD-10-PCS | Mod: CPTII,95,, | Performed by: PHYSICIAN ASSISTANT

## 2022-11-28 PROCEDURE — 99214 OFFICE O/P EST MOD 30 MIN: CPT | Mod: 95,,, | Performed by: PHYSICIAN ASSISTANT

## 2022-11-28 PROCEDURE — 1159F MED LIST DOCD IN RCRD: CPT | Mod: CPTII,95,, | Performed by: PHYSICIAN ASSISTANT

## 2022-11-28 PROCEDURE — 1160F RVW MEDS BY RX/DR IN RCRD: CPT | Mod: CPTII,95,, | Performed by: PHYSICIAN ASSISTANT

## 2022-11-28 PROCEDURE — 99214 PR OFFICE/OUTPT VISIT, EST, LEVL IV, 30-39 MIN: ICD-10-PCS | Mod: 95,,, | Performed by: PHYSICIAN ASSISTANT

## 2022-11-28 PROCEDURE — 1159F PR MEDICATION LIST DOCUMENTED IN MEDICAL RECORD: ICD-10-PCS | Mod: CPTII,95,, | Performed by: PHYSICIAN ASSISTANT

## 2022-11-28 RX ORDER — RIZATRIPTAN BENZOATE 10 MG/1
TABLET, ORALLY DISINTEGRATING ORAL
Qty: 12 TABLET | Refills: 5 | Status: SHIPPED | OUTPATIENT
Start: 2022-11-28 | End: 2023-09-21

## 2022-11-28 RX ORDER — PROPRANOLOL HYDROCHLORIDE 80 MG/1
80 CAPSULE, EXTENDED RELEASE ORAL DAILY
Qty: 30 CAPSULE | Refills: 5 | Status: SHIPPED | OUTPATIENT
Start: 2022-11-28 | End: 2023-06-07

## 2022-11-28 RX ORDER — AMITRIPTYLINE HYDROCHLORIDE 75 MG/1
75 TABLET ORAL NIGHTLY
Qty: 30 TABLET | Refills: 5 | Status: SHIPPED | OUTPATIENT
Start: 2022-11-28 | End: 2023-04-20

## 2022-11-28 RX ORDER — DICLOFENAC SODIUM 75 MG/1
75 TABLET, DELAYED RELEASE ORAL 2 TIMES DAILY PRN
Qty: 20 TABLET | Refills: 5 | Status: SHIPPED | OUTPATIENT
Start: 2022-11-28 | End: 2023-01-19

## 2022-11-28 NOTE — PROGRESS NOTES
The patient location is: [place]  The chief complaint leading to consultation is: HA f/up    Visit type: audiovisual    Face to Face time with patient: 5 min  10 minutes of total time spent on the encounter, which includes face to face time and non-face to face time preparing to see the patient (eg, review of tests), Obtaining and/or reviewing separately obtained history, Documenting clinical information in the electronic or other health record, Independently interpreting results (not separately reported) and communicating results to the patient/family/caregiver, or Care coordination (not separately reported).         Each patient to whom he or she provides medical services by telemedicine is:  (1) informed of the relationship between the physician and patient and the respective role of any other health care provider with respect to management of the patient; and (2) notified that he or she may decline to receive medical services by telemedicine and may withdraw from such care at any time.    Notes:       Established Patient        SUBJECTIVE:  Patient ID: Casie Copeland   Chief Complaint: Headache    History of Present Illness:  Casie Copeland is a 27 y.o. female with a PMHx of migraine, anxiety, ADHD, kidney stone once 3 years ago who presents via clinic alone for follow-up of headaches.       11/28/2022 - Interval History:  Ha's have improved in frequency since starting propranolol and are now occurring 8/30 days per month. Also feels her HR has increased and feels overall better since beginning propranolol. Denies SE's. Also feels her rescues work more efficiently now. These include both maxalt and diclofenac. Pt would like to try increasing dose of her propranolol.   Plan: increase propranolol 80mg/d, continue elavil 75mg/d, continue maxalt and diclofenac as rescues, rtc in 6-12 mo or sooner if needed    09/06/2022 - Interval History:   HA's have continued to worsen. Are now occurring 20-25/30 days per  "month with 1 debilitating HA over the last 3 months. Durations 1-5 hours. Pt was seen by psychiatry and started on lexapro which pt feels has not helped yet. Does endorse increased stress at this time with subsequent hypersomnolence and increased HR. Denies depression.  Maxalt - now takes repeated dosings to help  Discussed multiple options. Pt agreeable w/ following. Interested in botox.   Plan: start propranolol 60mg/d (d/c if SE's or depression occurs), continue elavil 75mg/d, continue maxalt, try diclofenac as 2nd line option, continue mgmt per psychiatry, rtc in 2-3 mo or sooner if needed    06/08/2022 - interval History:   After increasing elavil last visit, pt's Ha's became well controlled. Then sometimein February/March 2022, her Ha's worsened again. She contributes this worsening to increased stress, the passing of her father, decreased self care, and her psychiatrist moving. Denies SI. Has a good support system. Is pending a new psych visit ont [facility]. Ha's are now occurring 10-15/30 days per month. They last 2-6 hrs at a time.  Severity ranges 4-7/10, avg 6/10. Pt interested in botox. Answered all her questions regarding this. Will hold off due to not having all requirements at this time for insurance approval (needs to try 2+ ppxs and have >15 ha days per month x 3 months).   maxalt 10mg - resolves Ha's, needs a new refill as she has been w/o it for months.   Plan: incraese elavil 75mg/d, restart maxalt, see psychiatry, rtc 2-3 mo    05/10/2021 - Interval History:   Pt began tracking, reports 11/30 ha days, severity 4-9/10, duration 3 - 5 hours.   Elavil - no SE's  maxalt - takes 2.5 hrs on avg to resolve Ha's  Began using "headache hat go" which allows user to employ ice bag on head with a head band mechanism, pt finds this to be extremely helpful during Ha's.   Plan: increase elavil 50mg/d, increase maxalt 10mg prn, rtc 3 mo    Recommendations made at last Office Visit on 3/29/21:  - Discussed " symptoms appear to be consistent with migraines. Discussed this with patient along with treatment options and patient agreed with the following plan  - ppx - start elavil  - abortive - trial maxalt  - caution w/ tpx and zonis - h/o kidney stones  - risks, benefits, and potential side effects of elavil, maxalt discussed   - alternative treatment options offered   - importance of healthy diet, regular exercise and sleep hygiene in the treatment of headaches    - Start tracking headaches via Migraine Buddy isa on phone   - RTC in 6 wks     Treatments Tried:  Elavil - helps  lexapro  Propranolol - helps  Tpx/zonisamide - avoid 2/2 h/o kideny stones  maxalt - helps  Diclofenac - helps  otc excedrin  otc tylenol  Ibuprofen 800mg  Aleve    Current Medications:    Current Outpatient Medications:     amitriptyline (ELAVIL) 75 MG tablet, Take 1 tablet (75 mg total) by mouth every evening., Disp: 30 tablet, Rfl: 5    diclofenac (VOLTAREN) 75 MG EC tablet, Take 1 tablet (75 mg total) by mouth 2 (two) times daily as needed (for migraines). Do not exceed 2-3 days in a week., Disp: 20 tablet, Rfl: 5    EScitalopram oxalate (LEXAPRO) 10 MG tablet, Take 1 tablet (10 mg total) by mouth once daily., Disp: 30 tablet, Rfl: 2    fluticasone propionate (FLONASE) 50 mcg/actuation nasal spray, 2 sprays (100 mcg total) by Each Nostril route once daily., Disp: 16 g, Rfl: 5    NEXPLANON 68 mg Impl subdermal device, , Disp: , Rfl:     propranoloL (INDERAL LA) 80 MG 24 hr capsule, Take 1 capsule (80 mg total) by mouth once daily., Disp: 30 capsule, Rfl: 5    rizatriptan (MAXALT-MLT) 10 MG disintegrating tablet, May repeat in 2 hours if needed, Disp: 12 tablet, Rfl: 5    Review of Systems - as per HPI, otherwise a balanced 10 systems review is negative.    OBJECTIVE:  Vitals:  There were no vitals taken for this visit.     Physical Exam:  Constitutional: she appears well-developed and well-nourished. she is well groomed. NAD   HENT:    Head:  Normocephalic and atraumatic  Eyes: Conjunctivae and EOM are normal  Musculoskeletal: Normal range of motion. No joint stiffness.   Psychiatric: Mood and affect are normal    Neuro: Patient is alert and oriented to person, place, and time. Language is intact and fluent. Speech is clear and fluent. Recent and remote memory are intact.  Normal attention and concentration.  Facial movement is symmetric. Moves all 4 extremities against gravity.      Review of Data:   Notes from neuro reviewed   Labs:  No visits with results within 3 Month(s) from this visit.   Latest known visit with results is:   Lab Visit on 07/08/2022   Component Date Value Ref Range Status    WBC 07/08/2022 8.91  3.90 - 12.70 K/uL Final    RBC 07/08/2022 4.78  4.00 - 5.40 M/uL Final    Hemoglobin 07/08/2022 13.8  12.0 - 16.0 g/dL Final    Hematocrit 07/08/2022 42.9  37.0 - 48.5 % Final    MCV 07/08/2022 90  82 - 98 fL Final    MCH 07/08/2022 28.9  27.0 - 31.0 pg Final    MCHC 07/08/2022 32.2  32.0 - 36.0 g/dL Final    RDW 07/08/2022 15.2 (H)  11.5 - 14.5 % Final    Platelets 07/08/2022 474 (H)  150 - 450 K/uL Final    MPV 07/08/2022 10.3  9.2 - 12.9 fL Final    Immature Granulocytes 07/08/2022 0.4  0.0 - 0.5 % Final    Gran # (ANC) 07/08/2022 5.9  1.8 - 7.7 K/uL Final    Immature Grans (Abs) 07/08/2022 0.04  0.00 - 0.04 K/uL Final    Lymph # 07/08/2022 2.3  1.0 - 4.8 K/uL Final    Mono # 07/08/2022 0.4  0.3 - 1.0 K/uL Final    Eos # 07/08/2022 0.2  0.0 - 0.5 K/uL Final    Baso # 07/08/2022 0.03  0.00 - 0.20 K/uL Final    nRBC 07/08/2022 0  0 /100 WBC Final    Gran % 07/08/2022 66.6  38.0 - 73.0 % Final    Lymph % 07/08/2022 26.0  18.0 - 48.0 % Final    Mono % 07/08/2022 4.9  4.0 - 15.0 % Final    Eosinophil % 07/08/2022 1.8  0.0 - 8.0 % Final    Basophil % 07/08/2022 0.3  0.0 - 1.9 % Final    Differential Method 07/08/2022 Automated   Final    Sodium 07/08/2022 137  136 - 145 mmol/L Final    Potassium 07/08/2022 4.3  3.5 - 5.1 mmol/L Final     Chloride 07/08/2022 101  95 - 110 mmol/L Final    CO2 07/08/2022 25  23 - 29 mmol/L Final    Glucose 07/08/2022 113 (H)  70 - 110 mg/dL Final    BUN 07/08/2022 8  7 - 17 mg/dL Final    Creatinine 07/08/2022 0.76  0.50 - 1.40 mg/dL Final    Calcium 07/08/2022 9.2  8.7 - 10.5 mg/dL Final    Total Protein 07/08/2022 8.4  6.0 - 8.4 g/dL Final    Albumin 07/08/2022 4.6  3.5 - 5.2 g/dL Final    Total Bilirubin 07/08/2022 0.7  0.1 - 1.0 mg/dL Final    Alkaline Phosphatase 07/08/2022 138 (H)  38 - 126 U/L Final    AST 07/08/2022 26  15 - 46 U/L Final    ALT 07/08/2022 26  10 - 44 U/L Final    Anion Gap 07/08/2022 11  8 - 16 mmol/L Final    eGFR if African American 07/08/2022 >60.0  >60 mL/min/1.73 m^2 Final    eGFR if non African American 07/08/2022 >60.0  >60 mL/min/1.73 m^2 Final    Cholesterol 07/08/2022 154  120 - 199 mg/dL Final    Triglycerides 07/08/2022 77  30 - 150 mg/dL Final    HDL 07/08/2022 46  40 - 75 mg/dL Final    LDL Cholesterol 07/08/2022 92.6  63.0 - 159.0 mg/dL Final    HDL/Cholesterol Ratio 07/08/2022 29.9  20.0 - 50.0 % Final    Total Cholesterol/HDL Ratio 07/08/2022 3.3  2.0 - 5.0 Final    Non-HDL Cholesterol 07/08/2022 108  mg/dL Final    TSH 07/08/2022 0.628  0.400 - 4.000 uIU/mL Final     Imaging:  No results found for this or any previous visit.  Note: I have independently reviewed any/all imaging/labs/tests and agree with the report (s) as documented.  Any discrepancies will be as noted/demarcated by free text.  JEANNINE HURST 11/28/2022    ASSESSMENT:  1. Migraine without aura and without status migrainosus, not intractable            PLAN:  - Discussed symptoms appear to be consistent with migraines. Discussed this with patient along with treatment options and patient agreed with the following plan  - ppx - increase propranolol 80mg/d, continue elavil 75mg/d  - abortive - continue maxalt and diclofenac  - caution w/ tpx and zonis - h/o kidney stones  - stress - has upcoming appt w/ new  psychiatrist  - track ha's  - Discussed goals of therapy are to decrease the frequency, intensity, and duration of headaches  - RTC in 6-12 mo     Orders Placed This Encounter    propranoloL (INDERAL LA) 80 MG 24 hr capsule    rizatriptan (MAXALT-MLT) 10 MG disintegrating tablet    diclofenac (VOLTAREN) 75 MG EC tablet    amitriptyline (ELAVIL) 75 MG tablet           Questions and concerns were sought and answered to the patient's stated verbal satisfaction.  The patient verbalizes understanding and agreement with the above stated treatment plan.     CC: Shanelle Virk, TL Moore PA-C  [facility] [place]   [phone]    Dr. Farah was available during today's encounter.

## 2023-01-19 ENCOUNTER — OFFICE VISIT (OUTPATIENT)
Dept: FAMILY MEDICINE | Facility: CLINIC | Age: 28
End: 2023-01-19
Payer: COMMERCIAL

## 2023-01-19 VITALS
DIASTOLIC BLOOD PRESSURE: 72 MMHG | BODY MASS INDEX: 40.91 KG/M2 | OXYGEN SATURATION: 99 % | HEIGHT: 58 IN | HEART RATE: 93 BPM | TEMPERATURE: 98 F | SYSTOLIC BLOOD PRESSURE: 106 MMHG | WEIGHT: 194.88 LBS

## 2023-01-19 DIAGNOSIS — F33.1 MDD (MAJOR DEPRESSIVE DISORDER), RECURRENT EPISODE, MODERATE: Primary | ICD-10-CM

## 2023-01-19 DIAGNOSIS — D75.839 THROMBOCYTOSIS: ICD-10-CM

## 2023-01-19 DIAGNOSIS — F41.1 GENERALIZED ANXIETY DISORDER: ICD-10-CM

## 2023-01-19 DIAGNOSIS — Z13.220 SCREENING CHOLESTEROL LEVEL: ICD-10-CM

## 2023-01-19 DIAGNOSIS — R73.01 IFG (IMPAIRED FASTING GLUCOSE): ICD-10-CM

## 2023-01-19 DIAGNOSIS — Z00.00 ENCOUNTER FOR BLOOD TEST FOR ROUTINE GENERAL PHYSICAL EXAMINATION: ICD-10-CM

## 2023-01-19 DIAGNOSIS — Z87.442 PERSONAL HISTORY OF KIDNEY STONES: ICD-10-CM

## 2023-01-19 DIAGNOSIS — G43.009 MIGRAINE WITHOUT AURA AND WITHOUT STATUS MIGRAINOSUS, NOT INTRACTABLE: ICD-10-CM

## 2023-01-19 DIAGNOSIS — E66.01 CLASS 3 SEVERE OBESITY DUE TO EXCESS CALORIES WITHOUT SERIOUS COMORBIDITY WITH BODY MASS INDEX (BMI) OF 40.0 TO 44.9 IN ADULT: ICD-10-CM

## 2023-01-19 DIAGNOSIS — Z13.29 THYROID DISORDER SCREEN: ICD-10-CM

## 2023-01-19 PROBLEM — E66.813 CLASS 3 SEVERE OBESITY DUE TO EXCESS CALORIES WITHOUT SERIOUS COMORBIDITY WITH BODY MASS INDEX (BMI) OF 40.0 TO 44.9 IN ADULT: Status: ACTIVE | Noted: 2022-07-12

## 2023-01-19 PROCEDURE — 3074F SYST BP LT 130 MM HG: CPT | Mod: CPTII,S$GLB,, | Performed by: NURSE PRACTITIONER

## 2023-01-19 PROCEDURE — 99214 PR OFFICE/OUTPT VISIT, EST, LEVL IV, 30-39 MIN: ICD-10-PCS | Mod: S$GLB,,, | Performed by: NURSE PRACTITIONER

## 2023-01-19 PROCEDURE — 3044F HG A1C LEVEL LT 7.0%: CPT | Mod: CPTII,S$GLB,, | Performed by: NURSE PRACTITIONER

## 2023-01-19 PROCEDURE — 99999 PR PBB SHADOW E&M-EST. PATIENT-LVL IV: ICD-10-PCS | Mod: PBBFAC,,, | Performed by: NURSE PRACTITIONER

## 2023-01-19 PROCEDURE — 3078F DIAST BP <80 MM HG: CPT | Mod: CPTII,S$GLB,, | Performed by: NURSE PRACTITIONER

## 2023-01-19 PROCEDURE — 3008F BODY MASS INDEX DOCD: CPT | Mod: CPTII,S$GLB,, | Performed by: NURSE PRACTITIONER

## 2023-01-19 PROCEDURE — 3078F PR MOST RECENT DIASTOLIC BLOOD PRESSURE < 80 MM HG: ICD-10-PCS | Mod: CPTII,S$GLB,, | Performed by: NURSE PRACTITIONER

## 2023-01-19 PROCEDURE — 3044F PR MOST RECENT HEMOGLOBIN A1C LEVEL <7.0%: ICD-10-PCS | Mod: CPTII,S$GLB,, | Performed by: NURSE PRACTITIONER

## 2023-01-19 PROCEDURE — 1159F MED LIST DOCD IN RCRD: CPT | Mod: CPTII,S$GLB,, | Performed by: NURSE PRACTITIONER

## 2023-01-19 PROCEDURE — 3074F PR MOST RECENT SYSTOLIC BLOOD PRESSURE < 130 MM HG: ICD-10-PCS | Mod: CPTII,S$GLB,, | Performed by: NURSE PRACTITIONER

## 2023-01-19 PROCEDURE — 1159F PR MEDICATION LIST DOCUMENTED IN MEDICAL RECORD: ICD-10-PCS | Mod: CPTII,S$GLB,, | Performed by: NURSE PRACTITIONER

## 2023-01-19 PROCEDURE — 99214 OFFICE O/P EST MOD 30 MIN: CPT | Mod: S$GLB,,, | Performed by: NURSE PRACTITIONER

## 2023-01-19 PROCEDURE — 1160F RVW MEDS BY RX/DR IN RCRD: CPT | Mod: CPTII,S$GLB,, | Performed by: NURSE PRACTITIONER

## 2023-01-19 PROCEDURE — 1160F PR REVIEW ALL MEDS BY PRESCRIBER/CLIN PHARMACIST DOCUMENTED: ICD-10-PCS | Mod: CPTII,S$GLB,, | Performed by: NURSE PRACTITIONER

## 2023-01-19 PROCEDURE — 3008F PR BODY MASS INDEX (BMI) DOCUMENTED: ICD-10-PCS | Mod: CPTII,S$GLB,, | Performed by: NURSE PRACTITIONER

## 2023-01-19 PROCEDURE — 99999 PR PBB SHADOW E&M-EST. PATIENT-LVL IV: CPT | Mod: PBBFAC,,, | Performed by: NURSE PRACTITIONER

## 2023-01-19 NOTE — PROGRESS NOTES
Subjective:       Patient ID: Casie Copeland is a 27 y.o. female.    Chief Complaint: Follow-up (6 months F/U )    HPI    Patient is a 27 year old white female with chronic migraines followed by [facility], Obesity, IFG, Thrombocytosis, Personal History of kidney stones, reported history of ADHD and Anxiety/Depression diagnosed in first grade and treated by [facility] and Pediatric NP Valentina Infante in past and now followed by [facility] and elevated liver enzymes on wellness labs in July 2021 here today for 6 month follow up with fasting lab results.     Major Depression and CLARIBEL   followed and managed by [facility].  Taking Amitriptyline 75 mg at bedtime and Lexapro 10 mg daily     Migraines   followed by [facility].  On Maxalt prn abortive therapy        Elevated Platelet Count  Platelet count 474 in July 2022 that normalized 365 on 1/4/2023; again elevated 476 on 1/13/2023  Will continue to monitor - rest of CBC WNL     Impaired Fasting Glucose   in July 2022, no history of elevations  Significant family history of diabetes  Repeat  with HgbA1C 5.1% on 1/13/2023     Obesity  Body mass index is 40.73 kg/m².    Personal History of Kidney Stones  + kidney stones on 1/3/2023 - has to schedule urology follow up.      Component      Latest Ref Rng & Units 1/13/2023 1/4/2023 7/8/2022   WBC      3.90 - 12.70 K/uL 11.48 16.22 (H) 8.91   RBC      4.00 - 5.40 M/uL 4.73 4.96 4.78   Hemoglobin      12.0 - 16.0 g/dL 14.0 14.8 13.8   Hematocrit      37.0 - 48.5 % 42.5 43.9 42.9   MCV      82 - 98 fL 90 89 90   MCH      27.0 - 31.0 pg 29.6 29.8 28.9   MCHC      32.0 - 36.0 g/dL 32.9 33.7 32.2   RDW      11.5 - 14.5 % 13.6 13.5 15.2 (H)   Platelets      150 - 450 K/uL 476 (H) 365 474 (H)   MPV      9.2 - 12.9 fL 10.1 11.4 10.3   Immature Granulocytes      0.0 - 0.5 % 0.3 0.2 0.4   Gran # (ANC)      1.8 - 7.7 K/uL 7.4 14.5 (H) 5.9   Immature Grans (Abs)      0.00  - 0.04 K/uL 0.03 0.04 0.04   Lymph #      1.0 - 4.8 K/uL 3.3 1.0 2.3   Mono #      0.3 - 1.0 K/uL 0.5 0.6 0.4   Eos #      0.0 - 0.5 K/uL 0.2 0.0 0.2   Baso #      0.00 - 0.20 K/uL 0.05 0.06 0.03   nRBC      0 /100 WBC 0 0 0   Gran %      38.0 - 73.0 % 64.5 89.5 (H) 66.6   Lymph %      18.0 - 48.0 % 29.0 5.9 (L) 26.0   Mono %      4.0 - 15.0 % 4.5 3.8 (L) 4.9   Eosinophil %      0.0 - 8.0 % 1.3 0.2 1.8   Basophil %      0.0 - 1.9 % 0.4 0.4 0.3   Differential Method       Automated Automated Automated   Sodium      136 - 145 mmol/L 141 140 137   Potassium      3.5 - 5.1 mmol/L 4.4 4.3 4.3   Chloride      95 - 110 mmol/L 103 109 101   CO2      23 - 29 mmol/L 28 24 25   Glucose      70 - 110 mg/dL 103 117 (H) 113 (H)   BUN      7 - 17 mg/dL 9 12 8   Creatinine      0.50 - 1.40 mg/dL 0.73 0.67 0.76   Calcium      8.7 - 10.5 mg/dL 9.2 8.1 (L) 9.2   PROTEIN TOTAL      6.0 - 8.4 g/dL 8.1 6.9 8.4   Albumin      3.5 - 5.2 g/dL 4.4 3.8 4.6   BILIRUBIN TOTAL      0.1 - 1.0 mg/dL 0.4 0.3 0.7   Alkaline Phosphatase      38 - 126 U/L 117 105 138 (H)   AST      15 - 46 U/L 25 22 26   ALT      10 - 44 U/L 47 (H) 28 26   Anion Gap      8 - 16 mmol/L 10 7 (L) 11   eGFR      >60 mL/min/1.73 m:2 >60.0 >60.0    Hemoglobin A1C External      4.0 - 5.6 % 5.1     Estimated Avg Glucose      68 - 131 mg/dL 100         Review of Systems   Constitutional:  Negative for appetite change, chills, fatigue, fever and unexpected weight change.   HENT:  Negative for congestion, ear pain, mouth sores, nosebleeds, postnasal drip, rhinorrhea, sinus pressure, sneezing, sore throat, trouble swallowing and voice change.    Eyes:  Negative for photophobia, pain, discharge, redness, itching and visual disturbance.   Respiratory:  Negative for cough, chest tightness and shortness of breath.    Cardiovascular:  Negative for chest pain, palpitations and leg swelling.   Gastrointestinal:  Negative for abdominal pain, blood in stool, constipation, diarrhea, nausea  "and vomiting.   Genitourinary:  Positive for vaginal discharge. Negative for dysuria, frequency, hematuria and urgency.   Musculoskeletal:  Negative for arthralgias, back pain, joint swelling and myalgias.   Skin:  Negative for color change and rash.   Allergic/Immunologic: Negative for immunocompromised state.   Neurological:  Negative for dizziness, seizures, syncope, weakness and headaches.   Hematological:  Negative for adenopathy. Does not bruise/bleed easily.   Psychiatric/Behavioral:  Negative for agitation, dysphoric mood, sleep disturbance and suicidal ideas. The patient is not nervous/anxious.        Objective:     Vitals:    01/19/23 1457   BP: 106/72   Pulse: 93   Temp: 98.2 °F (36.8 °C)   TempSrc: Temporal   SpO2: 99%   Weight: 88.4 kg (194 lb 14.2 oz)   Height: 4' 10" (1.473 m)          Physical Exam  Constitutional:       General: She is not in acute distress.     Appearance: Normal appearance. She is well-developed. She is obese. She is not ill-appearing, toxic-appearing or diaphoretic.      Comments: Body mass index is 40.73 kg/m².     HENT:      Head: Normocephalic and atraumatic.      Right Ear: Tympanic membrane, ear canal and external ear normal.      Left Ear: Tympanic membrane, ear canal and external ear normal.      Nose: Nose normal.      Mouth/Throat:      Pharynx: No oropharyngeal exudate.   Eyes:      General:         Right eye: No discharge.         Left eye: No discharge.      Extraocular Movements: Extraocular movements intact.      Conjunctiva/sclera: Conjunctivae normal.   Neck:      Thyroid: No thyromegaly.      Trachea: No tracheal deviation.   Cardiovascular:      Rate and Rhythm: Normal rate and regular rhythm.      Heart sounds: Normal heart sounds. No murmur heard.  Pulmonary:      Effort: Pulmonary effort is normal. No respiratory distress.      Breath sounds: Normal breath sounds.   Abdominal:      General: There is no distension.      Palpations: Abdomen is soft. There is no " mass.      Tenderness: There is no abdominal tenderness. There is no guarding.      Hernia: No hernia is present.   Genitourinary:     Comments: Scheduled patient on next week for vaginal exam  Musculoskeletal:         General: No swelling or deformity. Normal range of motion.      Cervical back: Normal range of motion and neck supple.      Right lower leg: No edema.      Left lower leg: No edema.   Lymphadenopathy:      Cervical: No cervical adenopathy.   Skin:     General: Skin is warm and dry.      Findings: No rash.   Neurological:      Mental Status: She is alert and oriented to person, place, and time.      Cranial Nerves: No cranial nerve deficit.      Coordination: Coordination normal.   Psychiatric:         Mood and Affect: Mood normal.         Behavior: Behavior normal.         Thought Content: Thought content normal.         Judgment: Judgment normal.         Assessment:         ICD-10-CM ICD-9-CM   1. MDD (major depressive disorder), recurrent episode, moderate  F33.1 296.32   2. Generalized anxiety disorder  F41.1 300.02   3. Personal history of kidney stones  Z87.442 V13.01   4. Migraine without aura and without status migrainosus, not intractable  G43.009 346.10   5. Class 3 severe obesity due to excess calories without serious comorbidity with body mass index (BMI) of 40.0 to 44.9 in adult  E66.01 278.01    Z68.41 V85.41   6. IFG (impaired fasting glucose)  R73.01 790.21   7. Thrombocytosis  D75.839 238.71       Plan:       MDD (major depressive disorder), recurrent episode, moderate  Condition followed/treated by Specialist.  Please follow up with Specialist as advised.    Generalized anxiety disorder  Condition followed/treated by Specialist.  Please follow up with Specialist as advised.    Personal history of kidney stones  Needs to schedule follow up with urology    Migraine without aura and without status migrainosus, not intractable  Followed by Neurology    Class 3 severe obesity due to excess  calories without serious comorbidity with body mass index (BMI) of 40.0 to 44.9 in adult  Work on diet    IFG (impaired fasting glucose)  Work on diet    Thrombocytosis  Continue to monitor.      Follow up in about 6 months (around 7/19/2023) for fasting labs and WELLNESS EXAM.     Patient's Medications   New Prescriptions    No medications on file   Previous Medications    AMITRIPTYLINE (ELAVIL) 75 MG TABLET    Take 1 tablet (75 mg total) by mouth every evening.    ESCITALOPRAM OXALATE (LEXAPRO) 10 MG TABLET    Take 1 tablet (10 mg total) by mouth once daily.    FLUTICASONE PROPIONATE (FLONASE) 50 MCG/ACTUATION NASAL SPRAY    2 sprays (100 mcg total) by Each Nostril route once daily.    NEXPLANON 68 MG IMPL SUBDERMAL DEVICE        PROPRANOLOL (INDERAL LA) 80 MG 24 HR CAPSULE    Take 1 capsule (80 mg total) by mouth once daily.    RIZATRIPTAN (MAXALT-MLT) 10 MG DISINTEGRATING TABLET    May repeat in 2 hours if needed   Modified Medications    No medications on file   Discontinued Medications    AMOXICILLIN-CLAVULANATE 875-125MG (AUGMENTIN) 875-125 MG PER TABLET    Take 1 tablet by mouth 2 (two) times daily.    DICLOFENAC (VOLTAREN) 75 MG EC TABLET    Take 1 tablet (75 mg total) by mouth 2 (two) times daily as needed (for migraines). Do not exceed 2-3 days in a week.    KETOROLAC (TORADOL) 10 MG TABLET    Take 1 tablet (10 mg total) by mouth every 6 (six) hours.    ONDANSETRON (ZOFRAN-ODT) 4 MG TBDL    Take 1 tablet (4 mg total) by mouth every 8 (eight) hours as needed.    OXYCODONE-ACETAMINOPHEN (PERCOCET) 5-325 MG PER TABLET    Take 1 tablet by mouth every 4 (four) hours as needed for Pain.    TAMSULOSIN (FLOMAX) 0.4 MG CAP    Take 1 capsule (0.4 mg total) by mouth once daily.       Past Medical History:   Diagnosis Date    ADHD (attention deficit hyperactivity disorder)     diagnosed around 1st grade but does not remember where; was treated by [facility] and Valentina Infante, Pediatric NP in past  - off meds since age 19    Anxiety     Depression     [facility] and Valentina Infante pediatric NP in past - off all meds since age 19    History of psychiatric hospitalization     Hx of psychiatric care     Migraine without aura 10/13/2020    Personal history of kidney stones 1/19/2023    Psychiatric exam requested by [facility]     elementary school    Psychiatric problem     Therapy        Past Surgical History:   Procedure Laterality Date    TONSILLECTOMY         Family History   Problem Relation Age of Onset    Colon cancer Maternal Grandfather 73    Cancer Maternal Grandfather     Alcohol abuse Maternal Grandfather     Alzheimer's disease Maternal Grandfather     Colon cancer Maternal Aunt         great aunt    Alcohol abuse Maternal Aunt     Cancer Paternal Grandfather     Depression Mother     Migraines Mother     Depression Sister     Migraines Sister     Diabetes Father     Diabetes Sister     Diabetes Paternal Grandmother     Breast cancer Neg Hx     Ovarian cancer Neg Hx        Social History     Socioeconomic History    Marital status: Single    Number of children: 0    Years of education: 12    Highest education level: High school graduate   Occupational History    Occupation:  for [employer]   Tobacco Use    Smoking status: Every Day     Types: Vaping with nicotine     Start date: 2/14/2014    Smokeless tobacco: Never    Tobacco comments:     smoked cigarettes 2 ppd for 3 days and now vapes with nicotine   Substance and Sexual Activity    Alcohol use: Yes     Comment: maybe 1 glass of wine per month    Drug use: Never    Sexual activity: Yes     Partners: Male     Birth control/protection: Implant   Other Topics Concern    Patient feels they ought to cut down on drinking/drug use No    Patient annoyed by others criticizing their drinking/drug use No    Patient has felt bad or guilty about drinking/drug use No    Patient has had a drink/used drugs as  an eye opener in the AM No

## 2023-03-24 ENCOUNTER — PATIENT MESSAGE (OUTPATIENT)
Dept: OBSTETRICS AND GYNECOLOGY | Facility: CLINIC | Age: 28
End: 2023-03-24
Payer: COMMERCIAL

## 2023-03-28 ENCOUNTER — OFFICE VISIT (OUTPATIENT)
Dept: OBSTETRICS AND GYNECOLOGY | Facility: CLINIC | Age: 28
End: 2023-03-28
Payer: COMMERCIAL

## 2023-03-28 VITALS
BODY MASS INDEX: 39.06 KG/M2 | HEIGHT: 58 IN | SYSTOLIC BLOOD PRESSURE: 120 MMHG | WEIGHT: 186.06 LBS | DIASTOLIC BLOOD PRESSURE: 80 MMHG

## 2023-03-28 DIAGNOSIS — Z11.3 SCREEN FOR STD (SEXUALLY TRANSMITTED DISEASE): ICD-10-CM

## 2023-03-28 DIAGNOSIS — Z30.46 ENCOUNTER FOR REMOVAL AND REINSERTION OF NEXPLANON: Primary | ICD-10-CM

## 2023-03-28 DIAGNOSIS — Z01.419 ENCOUNTER FOR ANNUAL ROUTINE GYNECOLOGICAL EXAMINATION: ICD-10-CM

## 2023-03-28 PROCEDURE — 3079F PR MOST RECENT DIASTOLIC BLOOD PRESSURE 80-89 MM HG: ICD-10-PCS | Mod: CPTII,S$GLB,, | Performed by: OBSTETRICS & GYNECOLOGY

## 2023-03-28 PROCEDURE — 99395 PR PREVENTIVE VISIT,EST,18-39: ICD-10-PCS | Mod: S$GLB,,, | Performed by: OBSTETRICS & GYNECOLOGY

## 2023-03-28 PROCEDURE — 3008F PR BODY MASS INDEX (BMI) DOCUMENTED: ICD-10-PCS | Mod: CPTII,S$GLB,, | Performed by: OBSTETRICS & GYNECOLOGY

## 2023-03-28 PROCEDURE — 81514 NFCT DS BV&VAGINITIS DNA ALG: CPT | Performed by: OBSTETRICS & GYNECOLOGY

## 2023-03-28 PROCEDURE — 99395 PREV VISIT EST AGE 18-39: CPT | Mod: S$GLB,,, | Performed by: OBSTETRICS & GYNECOLOGY

## 2023-03-28 PROCEDURE — 87591 N.GONORRHOEAE DNA AMP PROB: CPT | Performed by: OBSTETRICS & GYNECOLOGY

## 2023-03-28 PROCEDURE — 88175 CYTOPATH C/V AUTO FLUID REDO: CPT | Performed by: OBSTETRICS & GYNECOLOGY

## 2023-03-28 PROCEDURE — 3044F HG A1C LEVEL LT 7.0%: CPT | Mod: CPTII,S$GLB,, | Performed by: OBSTETRICS & GYNECOLOGY

## 2023-03-28 PROCEDURE — 1159F MED LIST DOCD IN RCRD: CPT | Mod: CPTII,S$GLB,, | Performed by: OBSTETRICS & GYNECOLOGY

## 2023-03-28 PROCEDURE — 3074F SYST BP LT 130 MM HG: CPT | Mod: CPTII,S$GLB,, | Performed by: OBSTETRICS & GYNECOLOGY

## 2023-03-28 PROCEDURE — 3008F BODY MASS INDEX DOCD: CPT | Mod: CPTII,S$GLB,, | Performed by: OBSTETRICS & GYNECOLOGY

## 2023-03-28 PROCEDURE — 3079F DIAST BP 80-89 MM HG: CPT | Mod: CPTII,S$GLB,, | Performed by: OBSTETRICS & GYNECOLOGY

## 2023-03-28 PROCEDURE — 1159F PR MEDICATION LIST DOCUMENTED IN MEDICAL RECORD: ICD-10-PCS | Mod: CPTII,S$GLB,, | Performed by: OBSTETRICS & GYNECOLOGY

## 2023-03-28 PROCEDURE — 3044F PR MOST RECENT HEMOGLOBIN A1C LEVEL <7.0%: ICD-10-PCS | Mod: CPTII,S$GLB,, | Performed by: OBSTETRICS & GYNECOLOGY

## 2023-03-28 PROCEDURE — 3074F PR MOST RECENT SYSTOLIC BLOOD PRESSURE < 130 MM HG: ICD-10-PCS | Mod: CPTII,S$GLB,, | Performed by: OBSTETRICS & GYNECOLOGY

## 2023-03-28 NOTE — PROGRESS NOTES
Chief Complaint: Well Woman Exam     HPI:      Casie Copeland is a 27 y.o.  who presents today for well woman exam.  LMP: No LMP recorded. Patient has had an implant.  No issues, problems, or complaints. Specifically, patient denies abnormal vaginal bleeding, discharge, pelvic pain, urinary problems, or changes in appetite. Ms. Copeland is currently sexually active with a single male partner. She is currently using Nexplanon for contraception. Nexplanon placed 3/2020; desires removal and replacement. She would like STD screening today.    Previous Pap:  no abnormalities (11/10/2020)  Previous Mammogram:   No results found for this or any previous visit.\  Most Recent Dexa: not indicated  Colonoscopy: never had    COVID vaccine: completed series  Gardasil:Completed     Patient Active Problem List   Diagnosis    Patella, chondromalacia    Generalized anxiety disorder    Migraine without aura    Recurrent major depressive disorder    MDD (major depressive disorder), recurrent episode, moderate    Sleep difficulties    Class 3 severe obesity due to excess calories without serious comorbidity with body mass index (BMI) of 40.0 to 44.9 in adult    Personal history of kidney stones    Thrombocytosis    IFG (impaired fasting glucose)       Past Medical History:   Diagnosis Date    ADHD (attention deficit hyperactivity disorder)     diagnosed around 1st grade but does not remember where; was treated by [facility] and Valentina Infante Pediatric NP in past - off meds since age 19    Anxiety     Depression     treated by [facility] and Valentina Infante pediatric NP in past - off all meds since age 19    History of psychiatric hospitalization     Hx of psychiatric care     Migraine without aura 10/13/2020    Personal history of kidney stones 2023    [facility]iatric exam requested by [facility]    Psychiatric problem     Therapy        Past Surgical History:  "  Procedure Laterality Date    TONSILLECTOMY         OB History          0    Para   0    Term   0       0    AB   0    Living   0         SAB   0    IAB   0    Ectopic   0    Multiple   0    Live Births   0           Obstetric Comments   Menarche 13  Hx chlamydia               ROS:     Review of Systems   Constitutional:  Negative for activity change, appetite change and fatigue.   Respiratory:  Negative for shortness of breath.    Cardiovascular:  Negative for chest pain.   Gastrointestinal:  Negative for abdominal pain, constipation and diarrhea.   Endocrine: Negative for cold intolerance and heat intolerance.   Genitourinary:  Negative for dysuria, frequency, menstrual irregularity, pelvic pain and vaginal discharge.   Integumentary:  Negative for breast mass, breast discharge and breast tenderness.   Psychiatric/Behavioral:  Negative for dysphoric mood. The patient is not nervous/anxious.    Breast: Negative for mass and tenderness    Physical Exam:      PHYSICAL EXAM:  /80   Ht 4' 10" (1.473 m)   Wt 84.4 kg (186 lb 1.1 oz)   BMI 38.89 kg/m²   Body mass index is 38.89 kg/m².     APPEARANCE: Well nourished, well developed, in no acute distress.  PSYCH: Appropriate mood and affect.  SKIN: No acne or hirsutism  NECK: Neck symmetric without masses or thyromegaly  NODES: No inguinal, axillary, or supraclavicular lymph node enlargement  ABDOMEN: Soft.  No tenderness or masses.    CARDIOVASCULAR: No edema of peripheral extremities  BREASTS: Symmetrical, no skin changes or visible lesions.  No palpable masses or nipple discharge bilaterally.  PELVIC: Normal external genitalia without lesions.  Normal hair distribution.  Adequate perineal body, normal urethral meatus.  Vagina moist and well rugated without lesions or discharge.  Cervix pink, without lesions, discharge or tenderness.  No significant cystocele or rectocele.  Bimanual exam shows uterus to be normal size, regular, mobile and " nontender.  Adnexa without masses or tenderness.      Assessment:     1. Encounter for removal and reinsertion of Nexplanon  Device Authorization Order      2. Screen for STD (sexually transmitted disease)  C. trachomatis/N. gonorrhoeae by AMP DNA [facility]; Cervicovaginal    Vaginosis Screen by DNA Probe    Hepatitis B Surface Antigen    Hepatitis C Antibody    HIV 1/2 Ag/Ab (4th Gen)    RPR      3. Encounter for annual routine gynecological examination  Liquid-Based Pap Smear, Screening            Plan:     Clinical breast exam performed.  Pap collected.  Mammogram at 40 or as needed.  DEXA at 65 or as needed.  Colonoscopy at 45 or as needed.  Contraception: continue Nexplanon, request replacement device.  STD screening.  Follow up in about 1 month (around 4/28/2023) for Nexplanon insertion.    Counseling:     Patient was counseled today on current ASCCP pap guidelines, the recommendation for yearly pelvic exams, healthy diet and exercise routines, breast self awareness.She is to see her PCP for other health maintenance.     Use of the [facility] Patient Portal discussed and encouraged during today's visit.         Amanda N. Thomas, MD [facility]  03/28/2023

## 2023-03-30 LAB
BACTERIAL VAGINOSIS DNA: NEGATIVE
C TRACH DNA SPEC QL NAA+PROBE: NOT DETECTED
CANDIDA GLABRATA DNA: NEGATIVE
CANDIDA KRUSEI DNA: NEGATIVE
CANDIDA RRNA VAG QL PROBE: NEGATIVE
N GONORRHOEA DNA SPEC QL NAA+PROBE: NOT DETECTED
T VAGINALIS RRNA GENITAL QL PROBE: NEGATIVE

## 2023-04-01 LAB
FINAL PATHOLOGIC DIAGNOSIS: NORMAL
Lab: NORMAL

## 2023-04-18 ENCOUNTER — PROCEDURE VISIT (OUTPATIENT)
Dept: OBSTETRICS AND GYNECOLOGY | Facility: CLINIC | Age: 28
End: 2023-04-18
Payer: COMMERCIAL

## 2023-04-18 VITALS
HEIGHT: 58 IN | WEIGHT: 183.44 LBS | BODY MASS INDEX: 38.51 KG/M2 | SYSTOLIC BLOOD PRESSURE: 132 MMHG | DIASTOLIC BLOOD PRESSURE: 84 MMHG

## 2023-04-18 DIAGNOSIS — Z30.46 ENCOUNTER FOR REMOVAL AND REINSERTION OF NEXPLANON: ICD-10-CM

## 2023-04-18 DIAGNOSIS — Z30.9 ENCOUNTER FOR CONTRACEPTIVE MANAGEMENT, UNSPECIFIED TYPE: Primary | ICD-10-CM

## 2023-04-18 LAB
B-HCG UR QL: NEGATIVE
CTP QC/QA: YES

## 2023-04-18 PROCEDURE — 81025 URINE PREGNANCY TEST: CPT | Mod: S$GLB,,, | Performed by: OBSTETRICS & GYNECOLOGY

## 2023-04-18 PROCEDURE — 11983 REMOVAL OF NEXPLANON DEVICE: ICD-10-PCS | Mod: S$GLB,,, | Performed by: OBSTETRICS & GYNECOLOGY

## 2023-04-18 PROCEDURE — 81025 POCT URINE PREGNANCY: ICD-10-PCS | Mod: S$GLB,,, | Performed by: OBSTETRICS & GYNECOLOGY

## 2023-04-18 PROCEDURE — 11983 REMOVE/INSERT DRUG IMPLANT: CPT | Mod: S$GLB,,, | Performed by: OBSTETRICS & GYNECOLOGY

## 2023-04-18 PROCEDURE — 99499 UNLISTED E&M SERVICE: CPT | Mod: S$GLB,,, | Performed by: OBSTETRICS & GYNECOLOGY

## 2023-04-18 PROCEDURE — 99499 NO LOS: ICD-10-PCS | Mod: S$GLB,,, | Performed by: OBSTETRICS & GYNECOLOGY

## 2023-04-18 NOTE — PROCEDURES
Removal of Nexplanon Device    Date/Time: 4/18/2023 8:30 AM  Performed by: Claire Johnson MD  Authorized by: Claire Johnson MD     Consent obtained:  Written  Consent given by:  Patient  Procedure risks and benefits discussed: yes    Patient questions answered: yes    Patient agrees, verbalizes understanding, and wants to proceed: yes    Educational handouts given: yes    Instructions and paperwork completed: yes    Patient states understanding of procedure being performed: yes    Site marked: yes    Implant grasped by: hemostat  Removed with no complications: yes    Removal due to expiration: yes    Arm: left  Palpation confirms location: yes  Small stab incision was made in arm: yes  Upon removal device was intact: yes  Pre-procedure timeout performed: yes  Prepped with:  povidone-iodine 7.5% surgical scrub  Local anesthetic:  Lidocaine with epinephrine and lidocaine 1%   The site was cleaned  and prepped in a sterile fashion: yes  Insertion of Nexplanon    Date/Time: 4/18/2023 8:30 AM  Performed by: Claire Johnson MD  Authorized by: Claire Johnson MD     Consent obtained:  Written  Consent given by:  Patient  Patient questions answered: yes    Patient agrees, verbalizes understanding, and wants to proceed: yes    Educational handouts given: yes    Instructions and paperwork completed: yes    Prepped with: povidone-iodine    Local anesthetic:  Lidocaine with epinephrine and lidocaine 1%  The site was cleaned and prepped in a sterile fashion: yes    Stab incision: Implant inserted through stab incision of previously excised Nexplanon as above.    Left/right:  Left   68 mg etonogestreL 68 mg  Preloaded Implanon trocar was placed subdermally: yes    Visualization of implant was obtained: yes    Nexplanon was inserted and trocar removed: yes    Visualization of notch in stilette and palpitation of device: yes    Palpitation confirms placement by provider and patient: yes    Site was closed with  steri-strips and pressure bandage applied: yes

## 2023-04-20 ENCOUNTER — OFFICE VISIT (OUTPATIENT)
Dept: PSYCHIATRY | Facility: CLINIC | Age: 28
End: 2023-04-20
Payer: COMMERCIAL

## 2023-04-20 VITALS
HEART RATE: 99 BPM | BODY MASS INDEX: 38.59 KG/M2 | SYSTOLIC BLOOD PRESSURE: 118 MMHG | WEIGHT: 184.63 LBS | DIASTOLIC BLOOD PRESSURE: 72 MMHG

## 2023-04-20 DIAGNOSIS — G47.00 INSOMNIA, UNSPECIFIED TYPE: ICD-10-CM

## 2023-04-20 DIAGNOSIS — F41.1 GAD (GENERALIZED ANXIETY DISORDER): Primary | ICD-10-CM

## 2023-04-20 DIAGNOSIS — F33.1 MDD (MAJOR DEPRESSIVE DISORDER), RECURRENT EPISODE, MODERATE: ICD-10-CM

## 2023-04-20 PROCEDURE — 99214 OFFICE O/P EST MOD 30 MIN: CPT | Mod: S$GLB,,, | Performed by: NURSE PRACTITIONER

## 2023-04-20 PROCEDURE — 1159F MED LIST DOCD IN RCRD: CPT | Mod: CPTII,S$GLB,, | Performed by: NURSE PRACTITIONER

## 2023-04-20 PROCEDURE — 99999 PR PBB SHADOW E&M-EST. PATIENT-LVL II: ICD-10-PCS | Mod: PBBFAC,,, | Performed by: NURSE PRACTITIONER

## 2023-04-20 PROCEDURE — 99214 PR OFFICE/OUTPT VISIT, EST, LEVL IV, 30-39 MIN: ICD-10-PCS | Mod: S$GLB,,, | Performed by: NURSE PRACTITIONER

## 2023-04-20 PROCEDURE — 3074F PR MOST RECENT SYSTOLIC BLOOD PRESSURE < 130 MM HG: ICD-10-PCS | Mod: CPTII,S$GLB,, | Performed by: NURSE PRACTITIONER

## 2023-04-20 PROCEDURE — 3044F HG A1C LEVEL LT 7.0%: CPT | Mod: CPTII,S$GLB,, | Performed by: NURSE PRACTITIONER

## 2023-04-20 PROCEDURE — 3074F SYST BP LT 130 MM HG: CPT | Mod: CPTII,S$GLB,, | Performed by: NURSE PRACTITIONER

## 2023-04-20 PROCEDURE — 1159F PR MEDICATION LIST DOCUMENTED IN MEDICAL RECORD: ICD-10-PCS | Mod: CPTII,S$GLB,, | Performed by: NURSE PRACTITIONER

## 2023-04-20 PROCEDURE — 3008F BODY MASS INDEX DOCD: CPT | Mod: CPTII,S$GLB,, | Performed by: NURSE PRACTITIONER

## 2023-04-20 PROCEDURE — 1160F RVW MEDS BY RX/DR IN RCRD: CPT | Mod: CPTII,S$GLB,, | Performed by: NURSE PRACTITIONER

## 2023-04-20 PROCEDURE — 99999 PR PBB SHADOW E&M-EST. PATIENT-LVL II: CPT | Mod: PBBFAC,,, | Performed by: NURSE PRACTITIONER

## 2023-04-20 PROCEDURE — 3008F PR BODY MASS INDEX (BMI) DOCUMENTED: ICD-10-PCS | Mod: CPTII,S$GLB,, | Performed by: NURSE PRACTITIONER

## 2023-04-20 PROCEDURE — 3044F PR MOST RECENT HEMOGLOBIN A1C LEVEL <7.0%: ICD-10-PCS | Mod: CPTII,S$GLB,, | Performed by: NURSE PRACTITIONER

## 2023-04-20 PROCEDURE — 3078F DIAST BP <80 MM HG: CPT | Mod: CPTII,S$GLB,, | Performed by: NURSE PRACTITIONER

## 2023-04-20 PROCEDURE — 3078F PR MOST RECENT DIASTOLIC BLOOD PRESSURE < 80 MM HG: ICD-10-PCS | Mod: CPTII,S$GLB,, | Performed by: NURSE PRACTITIONER

## 2023-04-20 PROCEDURE — 1160F PR REVIEW ALL MEDS BY PRESCRIBER/CLIN PHARMACIST DOCUMENTED: ICD-10-PCS | Mod: CPTII,S$GLB,, | Performed by: NURSE PRACTITIONER

## 2023-04-20 RX ORDER — ESCITALOPRAM OXALATE 10 MG/1
10 TABLET ORAL DAILY
Qty: 30 TABLET | Refills: 2 | Status: SHIPPED | OUTPATIENT
Start: 2023-04-20 | End: 2023-07-17

## 2023-04-20 RX ORDER — HYDROXYZINE HYDROCHLORIDE 50 MG/1
TABLET, FILM COATED ORAL
Qty: 60 TABLET | Refills: 2 | Status: SHIPPED | OUTPATIENT
Start: 2023-04-20 | End: 2023-07-17

## 2023-04-20 NOTE — PROGRESS NOTES
Outpatient Psychiatry Follow-Up Visit (MD/NP)    4/20/2023 11:58 AM  Casie Copeland  1995  [id number]    Clinical Status of Patient:  Outpatient (Ambulatory)    Chief Complaint:  Casie Copeland, a 27 y.o. female,who presents today for follow up of depression and anxiety.  Met with patient.        Interval History/Subjective Report/Content of Current Session:     She was last seen by me on 6/16/2022 and was to return for follow up in 3 months. She is following up today.    Reports she has been off Lexapro for the past 3-4 months. She ran out of the med and then didn't feel she needed the medication so she didn't RTC. She endorses increased anxiety. Has trouble falling asleep. Has been [place].  Lives with her boyfriend. States they are both homebodies.  Works overnight as a  for the [employer].     Pt denies recurrent thoughts of death and denies SI/HI. Denies any sxs of maria antonia. Denies AVH, paranoia and delusions. No objective s/sx of psychosis or maria antonia. Denies any ASE from her psych meds.      Psychotherapy:  Target symptoms: depression, anxiety   Why chosen therapy is appropriate versus another modality: relevant to diagnosis, patient responds to this modality  Outcome monitoring methods: self-report, observation  Therapeutic intervention type: supportive psychotherapy  Topics discussed/themes: work stress, building skills sets for symptom management  The patient's response to the intervention is accepting. The patient's progress toward treatment goals is good.   Duration of intervention: 4 minutes.      Psychotropic medication review  Previous Trials-  Prozac  Concerta (longest), Vyvanse, Datrana  Patch, Adderall - Side effects: Adderall - made my heart race  Lexapro    Current meds-  None      History:         Past Medical, Family and Social History: The patient's past medical, family and social history, allergies, current medications, past surgical history, and problem list have been  reviewed and updated as appropriate within the electronic medical record.          Review of Systems       Review of Systems   Constitutional:  Negative for chills, fever and malaise/fatigue.   Respiratory:  Negative for cough and shortness of breath.    Cardiovascular:  Negative for chest pain and palpitations.   Gastrointestinal:  Negative for abdominal pain, diarrhea and vomiting.   Genitourinary:  Negative for dysuria and hematuria.   Musculoskeletal:  Negative for falls and myalgias.   Skin:  Negative for rash.   Neurological:  Negative for tremors, seizures and headaches.   Psychiatric/Behavioral:          See HPI       Compliance: no    Risk Parameters:  Patient reports no suicidal ideation  Patient reports no homicidal ideation  Patient reports no self-injurious behavior  Patient reports no violent behavior    Exam (detailed: at least 9 elements; comprehensive: all 15 elements)     Constitutional  Vitals:  Most recent vital signs, dated less than 90 days prior to this appointment, were reviewed.   Vitals:    04/20/23 1407   BP: 118/72   Pulse: 99   Weight: 83.7 kg (184 lb 10.2 oz)        General:  unremarkable, age appropriate, well nourished, casually dressed, neatly groomed, obese     Musculoskeletal  Muscle Strength/Tone:  no dyskinesia, no dystonia, no tremor, no tic   Gait & Station:  non-ataxic     Psychiatric      Appearance:  unremarkable, age appropriate, well nourished, casually dressed, neatly groomed, obese   Behavior:  normal, friendly and cooperative, eye contact normal     Speech:  no latency; no press   Mood & Affect:  euthymic  congruent and appropriate   Thought Process:  normal and logical   Associations:  intact   Thought Content:  normal, no suicidality, no homicidality, delusions, or paranoia   Insight:  intact, has awareness of illness   Judgement: behavior is adequate to circumstances, age appropriate   Orientation:  grossly intact   Memory: intact for content of interview   Language:  grossly intact   Attention Span & Concentration:  able to focus   Fund of Knowledge:  intact and appropriate to age and level of education       Medications:  Outpatient Encounter Medications as of 4/20/2023   Medication Sig Dispense Refill    amitriptyline (ELAVIL) 75 MG tablet Take 1 tablet (75 mg total) by mouth every evening. 30 tablet 5    EScitalopram oxalate (LEXAPRO) 10 MG tablet Take 1 tablet (10 mg total) by mouth once daily. 30 tablet 2    fluticasone propionate (FLONASE) 50 mcg/actuation nasal spray 2 sprays (100 mcg total) by Each Nostril route once daily. 16 g 5    NEXPLANON 68 mg Impl subdermal device       propranoloL (INDERAL LA) 80 MG 24 hr capsule Take 1 capsule (80 mg total) by mouth once daily. 30 capsule 5    rizatriptan (MAXALT-MLT) 10 MG disintegrating tablet May repeat in 2 hours if needed 12 tablet 5     Facility-Administered Encounter Medications as of 4/20/2023   Medication Dose Route Frequency Provider Last Rate Last Admin    etonogestreL subdermal device 68 mg  68 mg Implant  Claire Johnson MD   68 mg at 04/18/23 0830       Allergy:  Review of patient's allergies indicates:   Allergen Reactions    Clindamycin          Assessment and Diagnosis   Status/Progress: Based on the examination today, the patient's problem(s) is/are  inadequately controlled since pt ran out of her medication .  New problems have not been presented today.   Lack of compliance are complicating management of the primary condition.  There are no active rule-out diagnoses for this patient at this time.       General Impression:       ICD-10-CM ICD-9-CM   1. CLARIBEL (generalized anxiety disorder)  F41.1 300.02   2. MDD (major depressive disorder), recurrent episode, moderate  F33.1 296.32   3. Insomnia, unspecified type  G47.00 780.52       Intervention/Counseling/Treatment Plan     Medication Management:  Re-start Lexapro 10 mg q day  Start hydroxyzine  mg q hs prn insomnia  Labs: reviewed most recent  The  treatment plan and follow up plan were reviewed with the patient.  Discussed with patient informed consent, risks vs. benefits, alternative treatments, side effect profile and the inherent unpredictability of individual responses to these treatments. The patient expresses understanding of the above and displays the capacity to agree with this current plan and had no other questions.  Encouraged Patient to keep future appointments.   Take medications as prescribed and abstain from substance abuse.   Pt was told to present to ED or call 911 for SI/HI plan or intent, psychosis, or other psychiatric or medical emergency, and pt agrees to this and verbalized understanding.        Return to Clinic: 3 months, or sooner if needed        Face to Face time with patient: 18 minutes  Total time: 21 minutes of total time spent on the encounter, which includes face to face time and non-face to face time preparing to see the patient (eg, review of tests), Obtaining and/or reviewing separately obtained history, Documenting clinical information in the electronic or other health record, Independently interpreting results (not separately reported) and communicating results to the patient/family/caregiver, or Care coordination (not separately reported).       Lyla Maddox, MSN, APRN, PMHNP-BC [facility]

## 2023-06-03 DIAGNOSIS — G43.009 MIGRAINE WITHOUT AURA AND WITHOUT STATUS MIGRAINOSUS, NOT INTRACTABLE: ICD-10-CM

## 2023-06-05 NOTE — TELEPHONE ENCOUNTER
Last ordered: 11/28/2022 - 05/27/2023  Last filled: 3/08/23  Last seen: 11/28/2022  Upcoming appt: 6-12mo

## 2023-06-07 RX ORDER — PROPRANOLOL HYDROCHLORIDE 80 MG/1
CAPSULE, EXTENDED RELEASE ORAL
Qty: 90 CAPSULE | Refills: 1 | Status: SHIPPED | OUTPATIENT
Start: 2023-06-07 | End: 2023-08-04 | Stop reason: SDUPTHER

## 2023-07-16 DIAGNOSIS — F33.1 MDD (MAJOR DEPRESSIVE DISORDER), RECURRENT EPISODE, MODERATE: ICD-10-CM

## 2023-07-16 DIAGNOSIS — F41.1 GAD (GENERALIZED ANXIETY DISORDER): ICD-10-CM

## 2023-07-16 DIAGNOSIS — G47.00 INSOMNIA, UNSPECIFIED TYPE: ICD-10-CM

## 2023-07-17 RX ORDER — ESCITALOPRAM OXALATE 10 MG/1
TABLET ORAL
Qty: 30 TABLET | Refills: 0 | Status: SHIPPED | OUTPATIENT
Start: 2023-07-17 | End: 2031-01-30

## 2023-07-17 RX ORDER — HYDROXYZINE HYDROCHLORIDE 50 MG/1
TABLET, FILM COATED ORAL
Qty: 60 TABLET | Refills: 0 | Status: SHIPPED | OUTPATIENT
Start: 2023-07-17

## 2023-08-04 ENCOUNTER — OFFICE VISIT (OUTPATIENT)
Dept: FAMILY MEDICINE | Facility: CLINIC | Age: 28
End: 2023-08-04
Payer: COMMERCIAL

## 2023-08-04 VITALS
DIASTOLIC BLOOD PRESSURE: 62 MMHG | WEIGHT: 187.38 LBS | OXYGEN SATURATION: 98 % | BODY MASS INDEX: 37.77 KG/M2 | HEART RATE: 82 BPM | HEIGHT: 59 IN | TEMPERATURE: 98 F | SYSTOLIC BLOOD PRESSURE: 90 MMHG

## 2023-08-04 DIAGNOSIS — K21.9 GASTROESOPHAGEAL REFLUX DISEASE, UNSPECIFIED WHETHER ESOPHAGITIS PRESENT: ICD-10-CM

## 2023-08-04 DIAGNOSIS — Z87.442 PERSONAL HISTORY OF KIDNEY STONES: ICD-10-CM

## 2023-08-04 DIAGNOSIS — G43.009 MIGRAINE WITHOUT AURA AND WITHOUT STATUS MIGRAINOSUS, NOT INTRACTABLE: ICD-10-CM

## 2023-08-04 DIAGNOSIS — Z00.00 ANNUAL PHYSICAL EXAM: Primary | ICD-10-CM

## 2023-08-04 DIAGNOSIS — F33.1 MDD (MAJOR DEPRESSIVE DISORDER), RECURRENT EPISODE, MODERATE: ICD-10-CM

## 2023-08-04 DIAGNOSIS — E66.09 CLASS 2 OBESITY DUE TO EXCESS CALORIES WITHOUT SERIOUS COMORBIDITY WITH BODY MASS INDEX (BMI) OF 37.0 TO 37.9 IN ADULT: ICD-10-CM

## 2023-08-04 DIAGNOSIS — F41.1 GENERALIZED ANXIETY DISORDER: ICD-10-CM

## 2023-08-04 PROBLEM — D75.839 THROMBOCYTOSIS: Status: RESOLVED | Noted: 2023-01-19 | Resolved: 2023-08-04

## 2023-08-04 PROBLEM — R73.01 IFG (IMPAIRED FASTING GLUCOSE): Status: RESOLVED | Noted: 2023-01-19 | Resolved: 2023-08-04

## 2023-08-04 PROCEDURE — 99395 PREV VISIT EST AGE 18-39: CPT | Mod: S$GLB,,, | Performed by: NURSE PRACTITIONER

## 2023-08-04 PROCEDURE — 3008F PR BODY MASS INDEX (BMI) DOCUMENTED: ICD-10-PCS | Mod: CPTII,S$GLB,, | Performed by: NURSE PRACTITIONER

## 2023-08-04 PROCEDURE — 1160F RVW MEDS BY RX/DR IN RCRD: CPT | Mod: CPTII,S$GLB,, | Performed by: NURSE PRACTITIONER

## 2023-08-04 PROCEDURE — 3044F PR MOST RECENT HEMOGLOBIN A1C LEVEL <7.0%: ICD-10-PCS | Mod: CPTII,S$GLB,, | Performed by: NURSE PRACTITIONER

## 2023-08-04 PROCEDURE — 99999 PR PBB SHADOW E&M-EST. PATIENT-LVL IV: CPT | Mod: PBBFAC,,, | Performed by: NURSE PRACTITIONER

## 2023-08-04 PROCEDURE — 3078F DIAST BP <80 MM HG: CPT | Mod: CPTII,S$GLB,, | Performed by: NURSE PRACTITIONER

## 2023-08-04 PROCEDURE — 1159F MED LIST DOCD IN RCRD: CPT | Mod: CPTII,S$GLB,, | Performed by: NURSE PRACTITIONER

## 2023-08-04 PROCEDURE — 99214 OFFICE O/P EST MOD 30 MIN: CPT | Mod: 25,S$GLB,, | Performed by: NURSE PRACTITIONER

## 2023-08-04 PROCEDURE — 3008F BODY MASS INDEX DOCD: CPT | Mod: CPTII,S$GLB,, | Performed by: NURSE PRACTITIONER

## 2023-08-04 PROCEDURE — 3078F PR MOST RECENT DIASTOLIC BLOOD PRESSURE < 80 MM HG: ICD-10-PCS | Mod: CPTII,S$GLB,, | Performed by: NURSE PRACTITIONER

## 2023-08-04 PROCEDURE — 3074F SYST BP LT 130 MM HG: CPT | Mod: CPTII,S$GLB,, | Performed by: NURSE PRACTITIONER

## 2023-08-04 PROCEDURE — 99395 PR PREVENTIVE VISIT,EST,18-39: ICD-10-PCS | Mod: S$GLB,,, | Performed by: NURSE PRACTITIONER

## 2023-08-04 PROCEDURE — 99214 PR OFFICE/OUTPT VISIT, EST, LEVL IV, 30-39 MIN: ICD-10-PCS | Mod: 25,S$GLB,, | Performed by: NURSE PRACTITIONER

## 2023-08-04 PROCEDURE — 3044F HG A1C LEVEL LT 7.0%: CPT | Mod: CPTII,S$GLB,, | Performed by: NURSE PRACTITIONER

## 2023-08-04 PROCEDURE — 3074F PR MOST RECENT SYSTOLIC BLOOD PRESSURE < 130 MM HG: ICD-10-PCS | Mod: CPTII,S$GLB,, | Performed by: NURSE PRACTITIONER

## 2023-08-04 PROCEDURE — 1160F PR REVIEW ALL MEDS BY PRESCRIBER/CLIN PHARMACIST DOCUMENTED: ICD-10-PCS | Mod: CPTII,S$GLB,, | Performed by: NURSE PRACTITIONER

## 2023-08-04 PROCEDURE — 1159F PR MEDICATION LIST DOCUMENTED IN MEDICAL RECORD: ICD-10-PCS | Mod: CPTII,S$GLB,, | Performed by: NURSE PRACTITIONER

## 2023-08-04 PROCEDURE — 99999 PR PBB SHADOW E&M-EST. PATIENT-LVL IV: ICD-10-PCS | Mod: PBBFAC,,, | Performed by: NURSE PRACTITIONER

## 2023-08-04 RX ORDER — PANTOPRAZOLE SODIUM 40 MG/1
40 TABLET, DELAYED RELEASE ORAL DAILY
Qty: 30 TABLET | Refills: 1 | Status: SHIPPED | OUTPATIENT
Start: 2023-08-04 | End: 2023-11-07

## 2023-08-04 RX ORDER — PROPRANOLOL HYDROCHLORIDE 60 MG/1
60 CAPSULE, EXTENDED RELEASE ORAL DAILY
Qty: 90 CAPSULE | Refills: 0 | Status: SHIPPED | OUTPATIENT
Start: 2023-08-04 | End: 2023-11-13

## 2023-08-04 NOTE — PROGRESS NOTES
Subjective:       Patient ID: Casie Copeland is a 28 y.o. female.    Chief Complaint: Annual Exam and Gastroesophageal Reflux        Patient is a 28 year old white female with chronic migraines followed by [facility], Obesity, IFG, Thrombocytosis, Personal History of kidney stones, reported history of ADHD and Anxiety/Depression diagnosed in first grade and treated by [facility] and Pediatric NP Valentina Infante in past and now followed by [facility] and elevated liver enzymes on wellness labs in July 2021 here today for ANNUAL PHYSICAL EXAM with fasting lab results. Patient also has complaint of GERD worsening - will address complaint in same progress note an wellness exam below.     Major Depression and CLARIBEL   followed and managed by [facility].  Taking Lexapro 10 mg daily and Hydroxyzine as needed.     Migraines   followed by [facility] Mee Moore.  On Maxalt prn abortive therapy  On Propranolol LA 80 mg daily for maintenance therapy  BP on low end < 100/60  Will decrease the Propranolol down to 60 mg daily.          History of Elevated Platelet Count  Platelet count 474 in July 2022 that normalized 365 on 1/4/2023; again elevated 476 on 1/13/2023  Platelet count now back within range 314     Impaired Fasting Glucose   in July 2022, no history of elevations  Significant family history of diabetes  Repeat  with HgbA1C 5.1% on 1/13/2023  Current FBG 89 with HgbA1C 4.9%     Obesity  Body mass index is 37.85 kg/m².     Personal History of Kidney Stones  + kidney stones on 1/3/2023 - followed by Urology    GERD  Started with symptoms around 1 year ago  Comes and goes  Daily reflux symptoms  Takes TUMS  frequently  Never had further evaluated  Symptoms include nausea, burning/indigestion  Will start Protonix 40 mg for 8 weeks - if symptoms do not resolve - refer to GI    Wellness labs:  CBC WNL  CMP WNL  Cholesterol levels okay  HgbA1C 4.9%  TSH  WN    Health Maintenance:  Up to date    Component      Latest Ref Rng & Units 7/7/2023 1/13/2023 1/4/2023 7/8/2022   WBC      3.90 - 12.70 K/uL 8.70 11.48 16.22 (H) 8.91   RBC      4.00 - 5.40 M/uL 4.86 4.73 4.96 4.78   Hemoglobin      12.0 - 16.0 g/dL 14.5 14.0 14.8 13.8   Hematocrit      37.0 - 48.5 % 43.3 42.5 43.9 42.9   MCV      82 - 98 fL 89 90 89 90   MCH      27.0 - 31.0 pg 29.8 29.6 29.8 28.9   MCHC      32.0 - 36.0 g/dL 33.5 32.9 33.7 32.2   RDW      11.5 - 14.5 % 13.8 13.6 13.5 15.2 (H)   Platelets      150 - 450 K/uL 314 476 (H) 365 474 (H)   MPV      9.2 - 12.9 fL 10.9 10.1 11.4 10.3   Immature Granulocytes      0.0 - 0.5 % 0.1 0.3 0.2 0.4   Gran # (ANC)      1.8 - 7.7 K/uL 4.9 7.4 14.5 (H) 5.9   Immature Grans (Abs)      0.00 - 0.04 K/uL 0.01 0.03 0.04 0.04   Lymph #      1.0 - 4.8 K/uL 3.2 3.3 1.0 2.3   Mono #      0.3 - 1.0 K/uL 0.5 0.5 0.6 0.4   Eos #      0.0 - 0.5 K/uL 0.1 0.2 0.0 0.2   Baso #      0.00 - 0.20 K/uL 0.03 0.05 0.06 0.03   nRBC      0 /100 WBC 0 0 0 0   Gran %      38.0 - 73.0 % 56.6 64.5 89.5 (H) 66.6   Lymph %      18.0 - 48.0 % 36.2 29.0 5.9 (L) 26.0   Mono %      4.0 - 15.0 % 5.2 4.5 3.8 (L) 4.9   Eosinophil %      0.0 - 8.0 % 1.6 1.3 0.2 1.8   Basophil %      0.0 - 1.9 % 0.3 0.4 0.4 0.3   Differential Method       Automated Automated Automated Automated   Sodium      136 - 145 mmol/L 139 141 140 137   Potassium      3.5 - 5.1 mmol/L 3.8 4.4 4.3 4.3   Chloride      95 - 110 mmol/L 104 103 109 101   CO2      23 - 29 mmol/L 23 28 24 25   Glucose      70 - 110 mg/dL 89 103 117 (H) 113 (H)   BUN      7 - 17 mg/dL 11 9 12 8   Creatinine      0.50 - 1.40 mg/dL 0.90 0.73 0.67 0.76   Calcium      8.7 - 10.5 mg/dL 8.6 (L) 9.2 8.1 (L) 9.2   PROTEIN TOTAL      6.0 - 8.4 g/dL 7.5 8.1 6.9 8.4   Albumin      3.5 - 5.2 g/dL 4.0 4.4 3.8 4.6   BILIRUBIN TOTAL      0.1 - 1.0 mg/dL 0.5 0.4 0.3 0.7   Alkaline Phosphatase      38 - 126 U/L 98 117 105 138 (H)   AST      15 - 46 U/L 23 25 22 26  "  ALT      10 - 44 U/L 27 47 (H) 28 26   Anion Gap      8 - 16 mmol/L 12 10 7 (L) 11   eGFR      >60 mL/min/1.73 m:2 >60.0 >60.0 >60.0    Cholesterol      120 - 199 mg/dL 146   154   Triglycerides      30 - 150 mg/dL 63   77   HDL      40 - 75 mg/dL 39 (L)   46   LDL Cholesterol External      63.0 - 159.0 mg/dL 94.4   92.6   HDL/Cholesterol Ratio      20.0 - 50.0 % 26.7   29.9   Total Cholesterol/HDL Ratio      2.0 - 5.0 3.7   3.3   Non-HDL Cholesterol      mg/dL 107   108   Hemoglobin A1C External      4.0 - 5.6 % 4.9 5.1     Estimated Avg Glucose      68 - 131 mg/dL 94 100     TSH      0.400 - 4.000 uIU/mL 2.660   0.628         Review of Systems   Gastrointestinal:  Positive for nausea.        Acid reflux         Objective:     Vitals:    08/04/23 1321 08/04/23 1348   BP: 92/64 90/62   Pulse: 82    Temp: 97.9 °F (36.6 °C)    TempSrc: Temporal    SpO2: 98%    Weight: 85 kg (187 lb 6.3 oz)    Height: 4' 11" (1.499 m)           Physical Exam  Constitutional:       General: She is not in acute distress.     Appearance: Normal appearance. She is well-developed. She is obese. She is not ill-appearing, toxic-appearing or diaphoretic.      Comments: Body mass index is 37.85 kg/m².     HENT:      Head: Normocephalic and atraumatic.      Right Ear: Tympanic membrane, ear canal and external ear normal.      Left Ear: Tympanic membrane, ear canal and external ear normal.      Nose: Nose normal.      Mouth/Throat:      Pharynx: No oropharyngeal exudate.   Eyes:      General:         Right eye: No discharge.         Left eye: No discharge.      Extraocular Movements: Extraocular movements intact.      Conjunctiva/sclera: Conjunctivae normal.   Neck:      Thyroid: No thyromegaly.      Trachea: No tracheal deviation.   Cardiovascular:      Rate and Rhythm: Normal rate and regular rhythm.      Heart sounds: Normal heart sounds. No murmur heard.  Pulmonary:      Effort: Pulmonary effort is normal. No respiratory distress.      " Breath sounds: Normal breath sounds.   Abdominal:      General: There is no distension.      Palpations: Abdomen is soft. There is no mass.      Tenderness: There is no abdominal tenderness. There is no guarding.      Hernia: No hernia is present.   Musculoskeletal:         General: No swelling or deformity. Normal range of motion.      Cervical back: Normal range of motion and neck supple.      Right lower leg: No edema.      Left lower leg: No edema.   Lymphadenopathy:      Cervical: No cervical adenopathy.   Skin:     General: Skin is warm and dry.      Findings: No rash.   Neurological:      Mental Status: She is alert and oriented to person, place, and time.      Cranial Nerves: No cranial nerve deficit.      Coordination: Coordination normal.   Psychiatric:         Mood and Affect: Mood normal.         Behavior: Behavior normal.         Thought Content: Thought content normal.         Judgment: Judgment normal.           Assessment:         ICD-10-CM ICD-9-CM   1. Annual physical exam  Z00.00 V70.0   2. MDD (major depressive disorder), recurrent episode, moderate  F33.1 296.32   3. Generalized anxiety disorder  F41.1 300.02   4. Class 2 obesity due to excess calories without serious comorbidity with body mass index (BMI) of 37.0 to 37.9 in adult  E66.09 278.00    Z68.37 V85.37   5. Migraine without aura and without status migrainosus, not intractable  G43.009 346.10   6. Gastroesophageal reflux disease, unspecified whether esophagitis present  K21.9 530.81   7. Personal history of kidney stones  Z87.442 V13.01       Plan:       Annual physical exam  Health Maintenance Summary     Full History      Expand All  Collapse All    Postponed - COVID-19 Vaccine  (3 - Pfizer series)  Postponed until 8/4/2024 07/06/2021  Imm Admin: COVID-19, MRNA, LN-S, PF (Pfizer) (Purple Cap)   06/15/2021  Imm Admin: COVID-19, MRNA, LN-S, PF (Pfizer) (Purple Cap)     Influenza Vaccine  (1)  Next due on 9/1/2023  10/01/2009  Imm  Admin: Influenza     Hemoglobin A1c (Prediabetes)  (Yearly)  Next due on 7/7/2024 07/07/2023  Hemoglobin A1C External component of Hemoglobin A1C   01/13/2023  Hemoglobin A1C External component of Hemoglobin A1C     Pap Smear  (Every 3 Years)  Next due on 3/28/2026  03/28/2023  Liquid-Based Pap Smear, Screening   10/13/2020  Liquid-Based Pap Smear, Screening     TETANUS VACCINE  (Every 10 Years)  Next due on 11/4/2031 11/04/2021  Imm Admin: Tdap   09/30/2008  Imm Admin: Tdap     HPV Vaccines  (Series Information)  Completed  10/13/2020  Imm Admin: HPV 9-Valent   11/04/2013  Imm Admin: HPV Quadrivalent   05/24/2012  Imm Admin: HPV Quadrivalent   05/26/2011  Imm Admin: HPV Quadrivalent     Hepatitis C Screening  Completed  03/29/2023  Hepatitis C Ab component of Hepatitis C Antibody   07/01/2021  Hepatitis C Ab component of Hepatitis C Antibody     HIV Screening  Completed  03/29/2023  HIV 1/2 Ag/Ab (4th Gen)   07/01/2021  HIV 1/2 Ag/Ab (4th Gen)     Lipid Panel  Completed  07/07/2023  Lipid Panel   07/08/2022  Lipid Panel   07/01/2021  Lipid Panel     Discontinued - Pneumococcal Vaccines (Age 0-64)    Discontinued  No completion history exists for this topic.         MDD (major depressive disorder), recurrent episode, moderate  Condition followed/treated by Specialist.  Please follow up with Specialist as advised.    Generalized anxiety disorder  Condition followed/treated by Specialist.  Please follow up with Specialist as advised.    Class 2 obesity due to excess calories without serious comorbidity with body mass index (BMI) of 37.0 to 37.9 in adult  Work on lifestyle modifications.    Migraine without aura and without status migrainosus, not intractable  Will DECREASE the Propranolol LA from 80 mg down to 60 mg daily due to low blood pressure  Recheck BP in couple months.  Sent message to neurologist.  -     propranoloL (INDERAL LA) 60 MG 24 hr capsule; Take 1 capsule (60 mg total) by mouth once daily.   Dispense: 90 capsule; Refill: 0    Gastroesophageal reflux disease, unspecified whether esophagitis present  START Protonix 40 mg daily for 8 weeks.  If symptoms resolve and able to get off medication without return of symptoms - no further workup required.  If symptoms return once med stopped - refer GI  If symptoms do not improve - refer GI  -     pantoprazole (PROTONIX) 40 MG tablet; Take 1 tablet (40 mg total) by mouth once daily.  Dispense: 30 tablet; Refill: 1    Personal history of kidney stones  Condition followed/treated by Specialist.  Please follow up with Specialist as advised.      Follow up in about 3 months (around 11/4/2023) for follow up.     Patient's Medications   New Prescriptions    PANTOPRAZOLE (PROTONIX) 40 MG TABLET    Take 1 tablet (40 mg total) by mouth once daily.   Previous Medications    ESCITALOPRAM OXALATE (LEXAPRO) 10 MG TABLET    TAKE 1 TABLET BY MOUTH EVERY DAY    FLUTICASONE PROPIONATE (FLONASE) 50 MCG/ACTUATION NASAL SPRAY    2 sprays (100 mcg total) by Each Nostril route once daily.    HYDROXYZINE (ATARAX) 50 MG TABLET    TAKE 1-2 TABLETS BY MOUTH AT BEDTIME AS NEEDED INSOMNIA    NEXPLANON 68 MG IMPL SUBDERMAL DEVICE        RIZATRIPTAN (MAXALT-MLT) 10 MG DISINTEGRATING TABLET    May repeat in 2 hours if needed   Modified Medications    Modified Medication Previous Medication    PROPRANOLOL (INDERAL LA) 60 MG 24 HR CAPSULE propranoloL (INDERAL LA) 80 MG 24 hr capsule       Take 1 capsule (60 mg total) by mouth once daily.    TAKE 1 CAPSULE BY MOUTH ONCE DAILY.   Discontinued Medications    No medications on file       Past Medical History:   Diagnosis Date    ADHD (attention deficit hyperactivity disorder)     diagnosed around 1st grade but does not remember where; was treated by [facility] and Valentina Infante Pediatric NP in past - off meds since age 19    Anxiety     Depression     treated by [facility] and Valentina Infante pediatric NP in  past - off all meds since age 19    History of psychiatric hospitalization     Hx of psychiatric care     Migraine without aura 10/13/2020    Personal history of kidney stones 1/19/2023    Psychiatric exam requested by [facility]     elementary school    Psychiatric problem     Therapy        Past Surgical History:   Procedure Laterality Date    TONSILLECTOMY         Family History   Problem Relation Age of Onset    Colon cancer Maternal Grandfather 73    Cancer Maternal Grandfather     Alcohol abuse Maternal Grandfather     Alzheimer's disease Maternal Grandfather     Colon cancer Maternal Aunt         great aunt    Alcohol abuse Maternal Aunt     Cancer Paternal Grandfather     Depression Mother     Migraines Mother     Depression Sister     Migraines Sister     Diabetes Father     Diabetes Sister     Diabetes Paternal Grandmother     Breast cancer Neg Hx     Ovarian cancer Neg Hx        Social History     Socioeconomic History    Marital status: Single    Number of children: 0    Years of education: 12    Highest education level: High school graduate   Occupational History    Occupation:  for [employer]   Tobacco Use    Smoking status: Every Day     Current packs/day: 0.00     Types: Vaping with nicotine     Start date: 2/14/2014    Smokeless tobacco: Never    Tobacco comments:     smoked cigarettes 2 ppd for 3 days and now vapes with nicotine   Substance and Sexual Activity    Alcohol use: Yes     Comment: maybe 1 glass of wine per month    Drug use: Never    Sexual activity: Yes     Partners: Male     Birth control/protection: Implant   Other Topics Concern    Patient feels they ought to cut down on drinking/drug use No    Patient annoyed by others criticizing their drinking/drug use No    Patient has felt bad or guilty about drinking/drug use No    Patient has had a drink/used drugs as an eye opener in the AM No     Social Determinants of Health     Financial Resource Strain: Low Risk   (6/21/2021)    Overall Financial Resource Strain (CARDIA)     Difficulty of Paying Living Expenses: Not hard at all   Food Insecurity: No Food Insecurity (6/21/2021)    Hunger Vital Sign     Worried About Running Out of Food in the Last Year: Never true     Ran Out of Food in the Last Year: Never true   Transportation Needs: No Transportation Needs (6/21/2021)    PRAPARE - Transportation     Lack of Transportation (Medical): No     Lack of Transportation (Non-Medical): No   Physical Activity: Sufficiently Active (6/21/2021)    Exercise Vital Sign     Days of Exercise per Week: 7 days     Minutes of Exercise per Session: 90 min   Stress: Stress Concern Present (6/21/2021)    Citizen of [place] of Occupational Health - Occupational Stress Questionnaire     Feeling of Stress : Very much   Social Connections: Moderately Isolated (6/21/2021)    Social Connection and Isolation Panel [NHANES]     Frequency of Communication with Friends and Family: Three times a week     Frequency of Social Gatherings with Friends and Family: Twice a week     Attends Yazidism Services: Never     Active Member of Clubs or Organizations: No     Attends Club or Organization Meetings: Never     Marital Status: Living with partner   Housing Stability: Low Risk  (6/21/2021)    Housing Stability Vital Sign     Unable to Pay for Housing in the Last Year: No     Number of Places Lived in the Last Year: 1     Unstable Housing in the Last Year: No

## 2023-08-04 NOTE — Clinical Note
Patrick Caban, Seen patient for wellness today and BP running low 90s/60s.  I cut her Propranolol LA from 80 mg to 60 mg daily.  TL Timmons

## 2023-09-13 DIAGNOSIS — G43.009 MIGRAINE WITHOUT AURA AND WITHOUT STATUS MIGRAINOSUS, NOT INTRACTABLE: ICD-10-CM

## 2023-09-21 RX ORDER — RIZATRIPTAN BENZOATE 10 MG/1
TABLET, ORALLY DISINTEGRATING ORAL
Qty: 12 TABLET | Refills: 3 | Status: SHIPPED | OUTPATIENT
Start: 2023-09-21

## 2023-10-12 ENCOUNTER — PATIENT MESSAGE (OUTPATIENT)
Dept: FAMILY MEDICINE | Facility: CLINIC | Age: 28
End: 2023-10-12
Payer: COMMERCIAL

## 2023-10-12 DIAGNOSIS — K21.9 GASTROESOPHAGEAL REFLUX DISEASE, UNSPECIFIED WHETHER ESOPHAGITIS PRESENT: Primary | ICD-10-CM

## 2023-10-12 RX ORDER — FAMOTIDINE 40 MG/1
40 TABLET, FILM COATED ORAL DAILY
Qty: 30 TABLET | Refills: 0 | Status: SHIPPED | OUTPATIENT
Start: 2023-10-12 | End: 2023-10-16 | Stop reason: SDUPTHER

## 2023-10-16 ENCOUNTER — OFFICE VISIT (OUTPATIENT)
Dept: GASTROENTEROLOGY | Facility: CLINIC | Age: 28
End: 2023-10-16
Payer: COMMERCIAL

## 2023-10-16 ENCOUNTER — LAB VISIT (OUTPATIENT)
Dept: LAB | Facility: HOSPITAL | Age: 28
End: 2023-10-16
Attending: NURSE PRACTITIONER
Payer: COMMERCIAL

## 2023-10-16 VITALS — HEIGHT: 59 IN | BODY MASS INDEX: 37.65 KG/M2 | WEIGHT: 186.75 LBS

## 2023-10-16 DIAGNOSIS — K21.9 GASTROESOPHAGEAL REFLUX DISEASE, UNSPECIFIED WHETHER ESOPHAGITIS PRESENT: ICD-10-CM

## 2023-10-16 PROCEDURE — 99999 PR PBB SHADOW E&M-EST. PATIENT-LVL III: ICD-10-PCS | Mod: PBBFAC,,,

## 2023-10-16 PROCEDURE — 3044F HG A1C LEVEL LT 7.0%: CPT | Mod: CPTII,S$GLB,,

## 2023-10-16 PROCEDURE — 3008F PR BODY MASS INDEX (BMI) DOCUMENTED: ICD-10-PCS | Mod: CPTII,S$GLB,,

## 2023-10-16 PROCEDURE — 99999 PR PBB SHADOW E&M-EST. PATIENT-LVL III: CPT | Mod: PBBFAC,,,

## 2023-10-16 PROCEDURE — 99204 OFFICE O/P NEW MOD 45 MIN: CPT | Mod: S$GLB,,,

## 2023-10-16 PROCEDURE — 99204 PR OFFICE/OUTPT VISIT, NEW, LEVL IV, 45-59 MIN: ICD-10-PCS | Mod: S$GLB,,,

## 2023-10-16 PROCEDURE — 1159F PR MEDICATION LIST DOCUMENTED IN MEDICAL RECORD: ICD-10-PCS | Mod: CPTII,S$GLB,,

## 2023-10-16 PROCEDURE — 86677 HELICOBACTER PYLORI ANTIBODY: CPT

## 2023-10-16 PROCEDURE — 36415 COLL VENOUS BLD VENIPUNCTURE: CPT

## 2023-10-16 PROCEDURE — 3008F BODY MASS INDEX DOCD: CPT | Mod: CPTII,S$GLB,,

## 2023-10-16 PROCEDURE — 3044F PR MOST RECENT HEMOGLOBIN A1C LEVEL <7.0%: ICD-10-PCS | Mod: CPTII,S$GLB,,

## 2023-10-16 PROCEDURE — 1159F MED LIST DOCD IN RCRD: CPT | Mod: CPTII,S$GLB,,

## 2023-10-16 RX ORDER — FAMOTIDINE 40 MG/1
40 TABLET, FILM COATED ORAL DAILY
Qty: 30 TABLET | Refills: 3 | Status: SHIPPED | OUTPATIENT
Start: 2023-10-16 | End: 2023-11-09 | Stop reason: ALTCHOICE

## 2023-10-16 NOTE — PROGRESS NOTES
GASTROENTEROLOGY CLINIC NOTE    Reason for visit: The encounter diagnosis was Gastroesophageal reflux disease, unspecified whether esophagitis present.  Referring provider/PCP: Shanelle Virk NP    HPI:  Casie Copeland is a 28 y.o. female here today for GERD, ongoing for about a year. She reports symptoms occur daily- including pyrosis, belching, water brash. Symptoms worsen if bending over. She is taking protonix daily which seems to help but still with symptoms. Unable to  prescribed pepcid d/t insurance. Uses tums PRN. No abd pain, no NSAID use. Vomiting over the past two weeks, tolerating bland diet.     Prior Endoscopy:  EGD:  Colon:    (Portions of this note were dictated using voice recognition software and may contain dictation related errors in spelling/grammar/syntax not found on text review)    Review of Systems   Gastrointestinal:  Positive for heartburn and vomiting. Negative for abdominal pain.       Past Medical History: has a past medical history of ADHD (attention deficit hyperactivity disorder), Anxiety, Depression, History of psychiatric hospitalization, psychiatric care, Migraine without aura, Personal history of kidney stones, Psychiatric exam requested by authority, Psychiatric problem, and Therapy.    Past Surgical History: has a past surgical history that includes Tonsillectomy.    Home medications:   Current Outpatient Medications on File Prior to Visit   Medication Sig Dispense Refill    fluticasone propionate (FLONASE) 50 mcg/actuation nasal spray 2 sprays (100 mcg total) by Each Nostril route once daily. 16 g 5    hydrOXYzine (ATARAX) 50 MG tablet TAKE 1-2 TABLETS BY MOUTH AT BEDTIME AS NEEDED INSOMNIA 60 tablet 0    NEXPLANON 68 mg Impl subdermal device       pantoprazole (PROTONIX) 40 MG tablet Take 1 tablet (40 mg total) by mouth once daily. 30 tablet 1    propranoloL (INDERAL LA) 60 MG 24 hr capsule Take 1 capsule (60 mg total) by mouth once daily. 90 capsule 0     "rizatriptan (MAXALT-MLT) 10 MG disintegrating tablet TAKE 1 TABLET FOR MIGRAINE. MAY REPEAT IN 2HRS IF NEEDED 12 tablet 3    EScitalopram oxalate (LEXAPRO) 10 MG tablet TAKE 1 TABLET BY MOUTH EVERY DAY 30 tablet 0    [DISCONTINUED] famotidine (PEPCID) 40 MG tablet Take 1 tablet (40 mg total) by mouth once daily. (Patient not taking: Reported on 10/16/2023) 30 tablet 0     Current Facility-Administered Medications on File Prior to Visit   Medication Dose Route Frequency Provider Last Rate Last Admin    etonogestreL subdermal device 68 mg  68 mg Implant  Claire Johnson MD   68 mg at 04/18/23 0830       Vital signs:  Ht 4' 11" (1.499 m)   Wt 84.7 kg (186 lb 11.7 oz)   BMI 37.71 kg/m²     Physical Exam  Constitutional:       Appearance: Normal appearance. She is obese.   Abdominal:      General: There is no distension.   Neurological:      Mental Status: She is alert.       I have reviewed associated labs, imaging and notes.     Assessment:  1. Gastroesophageal reflux disease, unspecified whether esophagitis present    GERD, ongoing for about a year   Daily symptoms   Some relief with PPI, although still needs tums daily   No warning signs present    Trial of multiple antacids with some improvement, but not completely. Will r/o Hyplroi and schedule EGD for further evaluation.    Plan:  Orders Placed This Encounter    H. pylori Antibody, IgG    famotidine (PEPCID) 40 MG tablet    Case Request Endoscopy: EGD (ESOPHAGOGASTRODUODENOSCOPY)     Continue protonix as previously prescribed   Review GERD dietary triggers  Start pepcid nightly  Schedule EGD    F/U PRN    Rosa Kruger NP  [facility]    "

## 2023-10-16 NOTE — PATIENT INSTRUCTIONS
EGD Instructions    [facility] [address]  Clinic Office [phone]  Endoscopy Lab [phone]    You are scheduled for an EGD with Dr. Simpson  on  11/01/2023 at [facility] in [place].    Check in at the Hospital -1st floor, Information desk.   Call [phone] to reschedule.    You cannot have anything to eat or drink after Midnight. You can brush your teeth with a sip of water.     An adult friend/family member must come with you to drive you home.  You cannot drive, take a taxi, Uber/Lyft or bus to leave the Endoscopy Center alone.  If you do not have someone to drive you home, your test will be cancelled.     Please follow the directions of your doctor if you take any pills that thin your blood. If you take these meds: Aggrenox, Brilinta, Effient, Eliquis, Lovenox, Plavix, Pletal, Pradaxa, Ticilid, Xarelto or Coumadin, let the doctor's office know.    Please hold any GLP-1 medications prior to the procedure: Dulaglutide Trulicity(hold week prior), Exenatide Byetta (hold the morning of procedure), Semaglutide Ozempic (hold week prior), Liraglutide Victoza, Saxenda(hold week prior), Lixisenatide Adlyxin (hold the morning of procedure), Semaglutide Rybelsus (hold the morning of procedure), Tirzepatide Mounjaro (hold week prior)     DON'T: On the morning of the test do not take insulin or pills for diabetes.     DO: On the morning of the test, do take any pills for blood pressure, heart, anti-rejection and or seizures with a small sip of water. Bring any inhalers with you.    Leave all valuables and jewelry at home. You will be at the hospital for 2-4 hours.    Call the Endoscopy department at [phone] with any questions about your procedure.    Thank you for choosing [facility].

## 2023-10-16 NOTE — H&P (VIEW-ONLY)
GASTROENTEROLOGY CLINIC NOTE    Reason for visit: The encounter diagnosis was Gastroesophageal reflux disease, unspecified whether esophagitis present.  Referring provider/PCP: Shanelle Virk NP    HPI:  Casie Copeland is a 28 y.o. female here today for GERD, ongoing for about a year. She reports symptoms occur daily- including pyrosis, belching, water brash. Symptoms worsen if bending over. She is taking protonix daily which seems to help but still with symptoms. Unable to  prescribed pepcid d/t insurance. Uses tums PRN. No abd pain, no NSAID use. Vomiting over the past two weeks, tolerating bland diet.     Prior Endoscopy:  EGD:  Colon:    (Portions of this note were dictated using voice recognition software and may contain dictation related errors in spelling/grammar/syntax not found on text review)    Review of Systems   Gastrointestinal:  Positive for heartburn and vomiting. Negative for abdominal pain.       Past Medical History: has a past medical history of ADHD (attention deficit hyperactivity disorder), Anxiety, Depression, History of psychiatric hospitalization, psychiatric care, Migraine without aura, Personal history of kidney stones, Psychiatric exam requested by authority, Psychiatric problem, and Therapy.    Past Surgical History: has a past surgical history that includes Tonsillectomy.    Home medications:   Current Outpatient Medications on File Prior to Visit   Medication Sig Dispense Refill    fluticasone propionate (FLONASE) 50 mcg/actuation nasal spray 2 sprays (100 mcg total) by Each Nostril route once daily. 16 g 5    hydrOXYzine (ATARAX) 50 MG tablet TAKE 1-2 TABLETS BY MOUTH AT BEDTIME AS NEEDED INSOMNIA 60 tablet 0    NEXPLANON 68 mg Impl subdermal device       pantoprazole (PROTONIX) 40 MG tablet Take 1 tablet (40 mg total) by mouth once daily. 30 tablet 1    propranoloL (INDERAL LA) 60 MG 24 hr capsule Take 1 capsule (60 mg total) by mouth once daily. 90 capsule 0     "rizatriptan (MAXALT-MLT) 10 MG disintegrating tablet TAKE 1 TABLET FOR MIGRAINE. MAY REPEAT IN 2HRS IF NEEDED 12 tablet 3    EScitalopram oxalate (LEXAPRO) 10 MG tablet TAKE 1 TABLET BY MOUTH EVERY DAY 30 tablet 0    [DISCONTINUED] famotidine (PEPCID) 40 MG tablet Take 1 tablet (40 mg total) by mouth once daily. (Patient not taking: Reported on 10/16/2023) 30 tablet 0     Current Facility-Administered Medications on File Prior to Visit   Medication Dose Route Frequency Provider Last Rate Last Admin    etonogestreL subdermal device 68 mg  68 mg Implant  Claire Johnson MD   68 mg at 04/18/23 0830       Vital signs:  Ht 4' 11" (1.499 m)   Wt 84.7 kg (186 lb 11.7 oz)   BMI 37.71 kg/m²     Physical Exam  Constitutional:       Appearance: Normal appearance. She is obese.   Abdominal:      General: There is no distension.   Neurological:      Mental Status: She is alert.       I have reviewed associated labs, imaging and notes.     Assessment:  1. Gastroesophageal reflux disease, unspecified whether esophagitis present    GERD, ongoing for about a year   Daily symptoms   Some relief with PPI, although still needs tums daily   No warning signs present    Trial of multiple antacids with some improvement, but not completely. Will r/o Hyplroi and schedule EGD for further evaluation.    Plan:  Orders Placed This Encounter    H. pylori Antibody, IgG    famotidine (PEPCID) 40 MG tablet    Case Request Endoscopy: EGD (ESOPHAGOGASTRODUODENOSCOPY)     Continue protonix as previously prescribed   Review GERD dietary triggers  Start pepcid nightly  Schedule EGD    F/U PRN    Rosa Kruger NP  [facility]    "

## 2023-10-17 LAB — H PYLORI IGG SERPL QL IA: NEGATIVE

## 2023-10-30 ENCOUNTER — TELEPHONE (OUTPATIENT)
Dept: ENDOSCOPY | Facility: HOSPITAL | Age: 28
End: 2023-10-30
Payer: COMMERCIAL

## 2023-10-30 NOTE — TELEPHONE ENCOUNTER
Spoke with patient about arrival time @ 1130.   EGD    NPO status reviewed: Patient must have nothing to eat after midnight.      Medications: Do not take Insulin or oral diabetic medications the day of the procedure.  Take as prescribed: heart, seizure and blood pressure medication in the morning with a sip of water (less than an ounce).  Take any breathing medications and bring inhalers to hospital with you Leave all valuables and jewelry at home.     Wear comfortable clothes to procedure to change into hospital gown You cannot drive for 24 hours after your procedure because you will receive sedation for your procedure to make you comfortable.  A ride must be provided at discharge.

## 2023-11-01 ENCOUNTER — HOSPITAL ENCOUNTER (OUTPATIENT)
Facility: HOSPITAL | Age: 28
Discharge: HOME | End: 2023-11-01
Attending: INTERNAL MEDICINE | Admitting: INTERNAL MEDICINE
Payer: COMMERCIAL

## 2023-11-01 ENCOUNTER — ANESTHESIA (OUTPATIENT)
Dept: ENDOSCOPY | Facility: HOSPITAL | Age: 28
End: 2023-11-01
Payer: COMMERCIAL

## 2023-11-01 ENCOUNTER — ANESTHESIA EVENT (OUTPATIENT)
Dept: ENDOSCOPY | Facility: HOSPITAL | Age: 28
End: 2023-11-01
Payer: COMMERCIAL

## 2023-11-01 VITALS
RESPIRATION RATE: 18 BRPM | HEIGHT: 58 IN | DIASTOLIC BLOOD PRESSURE: 78 MMHG | WEIGHT: 183 LBS | OXYGEN SATURATION: 100 % | SYSTOLIC BLOOD PRESSURE: 125 MMHG | TEMPERATURE: 98 F | BODY MASS INDEX: 38.41 KG/M2 | HEART RATE: 82 BPM

## 2023-11-01 DIAGNOSIS — K21.9 GERD (GASTROESOPHAGEAL REFLUX DISEASE): ICD-10-CM

## 2023-11-01 LAB
B-HCG UR QL: NEGATIVE
B-HCG UR QL: NEGATIVE
CTP QC/QA: YES
CTP QC/QA: YES

## 2023-11-01 PROCEDURE — 81025 URINE PREGNANCY TEST: CPT | Performed by: INTERNAL MEDICINE

## 2023-11-01 PROCEDURE — D9220A PRA ANESTHESIA: Mod: ANES,,, | Performed by: STUDENT IN AN ORGANIZED HEALTH CARE EDUCATION/TRAINING PROGRAM

## 2023-11-01 PROCEDURE — 63600175 PHARM REV CODE 636 W HCPCS: Performed by: NURSE ANESTHETIST, CERTIFIED REGISTERED

## 2023-11-01 PROCEDURE — D9220A PRA ANESTHESIA: Mod: CRNA,,, | Performed by: NURSE ANESTHETIST, CERTIFIED REGISTERED

## 2023-11-01 PROCEDURE — 37000008 HC ANESTHESIA 1ST 15 MINUTES: Performed by: INTERNAL MEDICINE

## 2023-11-01 PROCEDURE — D9220A PRA ANESTHESIA: ICD-10-PCS | Mod: ANES,,, | Performed by: STUDENT IN AN ORGANIZED HEALTH CARE EDUCATION/TRAINING PROGRAM

## 2023-11-01 PROCEDURE — 43235 EGD DIAGNOSTIC BRUSH WASH: CPT | Mod: ,,, | Performed by: INTERNAL MEDICINE

## 2023-11-01 PROCEDURE — 37000009 HC ANESTHESIA EA ADD 15 MINS: Performed by: INTERNAL MEDICINE

## 2023-11-01 PROCEDURE — 25000003 PHARM REV CODE 250: Performed by: NURSE ANESTHETIST, CERTIFIED REGISTERED

## 2023-11-01 PROCEDURE — 25000003 PHARM REV CODE 250: Performed by: INTERNAL MEDICINE

## 2023-11-01 PROCEDURE — 43235 PR EGD, FLEX, DIAGNOSTIC: ICD-10-PCS | Mod: ,,, | Performed by: INTERNAL MEDICINE

## 2023-11-01 PROCEDURE — 43235 EGD DIAGNOSTIC BRUSH WASH: CPT | Performed by: INTERNAL MEDICINE

## 2023-11-01 PROCEDURE — D9220A PRA ANESTHESIA: ICD-10-PCS | Mod: CRNA,,, | Performed by: NURSE ANESTHETIST, CERTIFIED REGISTERED

## 2023-11-01 RX ORDER — PROPOFOL 10 MG/ML
VIAL (ML) INTRAVENOUS
Status: DISCONTINUED | OUTPATIENT
Start: 2023-11-01 | End: 2023-11-01

## 2023-11-01 RX ORDER — LIDOCAINE HYDROCHLORIDE 20 MG/ML
INJECTION INTRAVENOUS
Status: DISCONTINUED | OUTPATIENT
Start: 2023-11-01 | End: 2023-11-01

## 2023-11-01 RX ORDER — SODIUM CHLORIDE 9 MG/ML
INJECTION, SOLUTION INTRAVENOUS CONTINUOUS
Status: DISCONTINUED | OUTPATIENT
Start: 2023-11-01 | End: 2023-11-01 | Stop reason: HOSPADM

## 2023-11-01 RX ORDER — SODIUM CHLORIDE 0.9 % (FLUSH) 0.9 %
10 SYRINGE (ML) INJECTION
Status: DISCONTINUED | OUTPATIENT
Start: 2023-11-01 | End: 2023-11-01 | Stop reason: HOSPADM

## 2023-11-01 RX ORDER — PROPOFOL 10 MG/ML
VIAL (ML) INTRAVENOUS CONTINUOUS PRN
Status: DISCONTINUED | OUTPATIENT
Start: 2023-11-01 | End: 2023-11-01

## 2023-11-01 RX ADMIN — LIDOCAINE HYDROCHLORIDE 100 MG: 20 INJECTION, SOLUTION INTRAVENOUS at 02:11

## 2023-11-01 RX ADMIN — PROPOFOL 75 MG: 10 INJECTION, EMULSION INTRAVENOUS at 02:11

## 2023-11-01 RX ADMIN — SODIUM CHLORIDE: 9 INJECTION, SOLUTION INTRAVENOUS at 12:11

## 2023-11-01 RX ADMIN — PROPOFOL 150 MCG/KG/MIN: 10 INJECTION, EMULSION INTRAVENOUS at 02:11

## 2023-11-01 NOTE — TRANSFER OF CARE
"Anesthesia Transfer of Care Note    Patient: Casie Copeland    Procedure(s) Performed: Procedure(s) (LRB):  EGD (ESOPHAGOGASTRODUODENOSCOPY) (N/A)    Patient location: GI    Anesthesia Type: general    Transport from OR: Transported from OR on room air with adequate spontaneous ventilation    Post pain: adequate analgesia    Post assessment: no apparent anesthetic complications and tolerated procedure well    Post vital signs: stable    Level of consciousness: awake and alert    Nausea/Vomiting: no nausea/vomiting    Complications: none    Transfer of care protocol was followed      Last vitals: Visit Vitals  /71 (BP Location: Left arm, Patient Position: Lying)   Pulse 84   Temp 37.2 °C (99 °F) (Temporal)   Resp 18   Ht 4' 10" (1.473 m)   Wt 83 kg (183 lb)   LMP  (LMP Unknown)   SpO2 100%   Breastfeeding No   BMI 38.25 kg/m²     "

## 2023-11-01 NOTE — PROVATION PATIENT INSTRUCTIONS
Discharge Summary/Instructions after an Endoscopic Procedure  Patient Name: Casie Copeland  Patient MRN: [id number]  Patient YOB: 1995 Wednesday, November 1, 2023  James Simpson MD  Dear patient,  As a result of recent federal legislation (The Federal Cures Act), you may   receive lab or pathology results from your procedure in your [facility]   account before your physician is able to contact you. Your physician or   their representative will relay the results to you with their   recommendations at their soonest availability.  Thank you,  Your health is very important to us during the Covid Crisis. Following your   procedure today, you will receive a daily text for 2 weeks asking about   signs or symptoms of Covid 19.  Please respond to this text when you   receive it so we can follow up and keep you as safe as possible.   RESTRICTIONS:  During your procedure today, you received medications for sedation.  These   medications may affect your judgment, balance and coordination.  Therefore,   for 24 hours, you have the following restrictions:   - DO NOT drive a car, operate machinery, make legal/financial decisions,   sign important papers or drink alcohol.    ACTIVITY:  Today: no heavy lifting, straining or running due to procedural   sedation/anesthesia.  The following day: return to full activity including work.  DIET:  Eat and drink normally unless instructed otherwise.     TREATMENT FOR COMMON SIDE EFFECTS:  - Mild abdominal pain, nausea, belching, bloating or excessive gas:  rest,   eat lightly and use a heating pad.  - Sore Throat: treat with throat lozenges and/or gargle with warm salt   water.  - Because air was used during the procedure, expelling large amounts of air   from your rectum or belching is normal.  - If a bowel prep was taken, you may not have a bowel movement for 1-3 days.    This is normal.  SYMPTOMS TO WATCH FOR AND REPORT TO YOUR PHYSICIAN:  1. Abdominal pain or bloating,  other than gas cramps.  2. Chest pain.  3. Back pain.  4. Signs of infection such as: chills or fever occurring within 24 hours   after the procedure.  5. Rectal bleeding, which would show as bright red, maroon, or black stools.   (A tablespoon of blood from the rectum is not serious, especially if   hemorrhoids are present.)  6. Vomiting.  7. Weakness or dizziness.  GO DIRECTLY TO THE NEAREST EMERGENCY ROOM IF YOU HAVE ANY OF THE FOLLOWING:      Difficulty breathing              Chills and/or fever over 101 F   Persistent vomiting and/or vomiting blood   Severe abdominal pain   Severe chest pain   Black, tarry stools   Bleeding- more than one tablespoon   Any other symptom or condition that you feel may need urgent attention  Your doctor recommends these additional instructions:  If any biopsies were taken, your doctors clinic will contact you in 1 to 2   weeks with any results.  - Discharge patient to home.   - Patient has a contact number available for emergencies.  The signs and   symptoms of potential delayed complications were discussed with the   patient.  Return to normal activities tomorrow.  Written discharge   instructions were provided to the patient.   - Resume previous diet.   - Continue present medications.   - Repeat upper endoscopy in 3 months to check healing.   - Increase protonix to 40mg twice a day ; consider referral for hiatal   hernia repair, would encourage weight loss which is part of standard   treatment for severe reflux.  For questions, problems or results please call your physician - James Simpson MD.  EMERGENCY PHONE NUMBER: [phone],  LAB RESULTS: [phone]  IF A COMPLICATION OR EMERGENCY SITUATION ARISES AND YOU ARE UNABLE TO REACH   YOUR PHYSICIAN - GO DIRECTLY TO THE EMERGENCY ROOM.  James Simpson MD  11/1/2023 2:26:21 PM  This report has been verified and signed electronically.  Dear patient,  As a result of recent federal legislation (The Federal Cures Act),  you may   receive lab or pathology results from your procedure in your [facility]   account before your physician is able to contact you. Your physician or   their representative will relay the results to you with their   recommendations at their soonest availability.  Thank you,  PROVATION

## 2023-11-01 NOTE — ANESTHESIA PREPROCEDURE EVALUATION
[facility]  Anesthesia Pre-Operative Evaluation         Patient Name: Casie Copeland  YOB: 1995  MRN: [id number]    SUBJECTIVE:     11/01/2023    Procedure(s) (LRB):  EGD (ESOPHAGOGASTRODUODENOSCOPY) (N/A)    Casie Copeland is a 28 y.o. female here for Procedure(s) (LRB):  EGD (ESOPHAGOGASTRODUODENOSCOPY) (N/A)    Drips:     Patient Active Problem List   Diagnosis    Patella, chondromalacia    Generalized anxiety disorder    Migraine without aura    Recurrent major depressive disorder    MDD (major depressive disorder), recurrent episode, moderate    Sleep difficulties    Class 2 obesity due to excess calories without serious comorbidity with body mass index (BMI) of 37.0 to 37.9 in adult    Personal history of kidney stones       Review of patient's allergies indicates:   Allergen Reactions    Clindamycin        Current Facility-Administered Medications on File Prior to Encounter   Medication Dose Route Frequency Provider Last Rate Last Admin    etonogestreL subdermal device 68 mg  68 mg Implant  Claire Johnson MD   68 mg at 04/18/23 0830     Current Outpatient Medications on File Prior to Encounter   Medication Sig Dispense Refill    EScitalopram oxalate (LEXAPRO) 10 MG tablet TAKE 1 TABLET BY MOUTH EVERY DAY 30 tablet 0    famotidine (PEPCID) 40 MG tablet Take 1 tablet (40 mg total) by mouth once daily. 30 tablet 3    fluticasone propionate (FLONASE) 50 mcg/actuation nasal spray 2 sprays (100 mcg total) by Each Nostril route once daily. 16 g 5    hydrOXYzine (ATARAX) 50 MG tablet TAKE 1-2 TABLETS BY MOUTH AT BEDTIME AS NEEDED INSOMNIA 60 tablet 0    NEXPLANON 68 mg Impl subdermal device       pantoprazole (PROTONIX) 40 MG tablet Take 1 tablet (40 mg total) by mouth once daily. 30 tablet 1    propranoloL (INDERAL LA) 60 MG 24 hr capsule Take 1 capsule (60 mg total) by  mouth once daily. 90 capsule 0    rizatriptan (MAXALT-MLT) 10 MG disintegrating tablet TAKE 1 TABLET FOR MIGRAINE. MAY REPEAT IN 2HRS IF NEEDED 12 tablet 3       Past Surgical History:   Procedure Laterality Date    TONSILLECTOMY         Social History     Socioeconomic History    Marital status: Single    Number of children: 0    Years of education: 12    Highest education level: High school graduate   Occupational History    Occupation:  for [employer]   Tobacco Use    Smoking status: Every Day     Types: Vaping with nicotine     Start date: 2/14/2014    Smokeless tobacco: Never    Tobacco comments:     smoked cigarettes 2 ppd for 3 days and now vapes with nicotine   Substance and Sexual Activity    Alcohol use: Yes     Comment: maybe 1 glass of wine per month    Drug use: Never    Sexual activity: Yes     Partners: Male     Birth control/protection: Implant   Other Topics Concern    Patient feels they ought to cut down on drinking/drug use No    Patient annoyed by others criticizing their drinking/drug use No    Patient has felt bad or guilty about drinking/drug use No    Patient has had a drink/used drugs as an eye opener in the AM No     Social Determinants of Health     Financial Resource Strain: Low Risk  (6/21/2021)    Overall Financial Resource Strain (CARDIA)     Difficulty of Paying Living Expenses: Not hard at all   Food Insecurity: No Food Insecurity (6/21/2021)    Hunger Vital Sign     Worried About Running Out of Food in the Last Year: Never true     Ran Out of Food in the Last Year: Never true   Transportation Needs: No Transportation Needs (6/21/2021)    PRAPARE - Transportation     Lack of Transportation (Medical): No     Lack of Transportation (Non-Medical): No   Physical Activity: Sufficiently Active (6/21/2021)    Exercise Vital Sign     Days of Exercise per Week: 7 days     Minutes of Exercise per Session: 90 min   Stress: Stress Concern Present (6/21/2021)    [place]  [place] [employer] - Occupational Stress Questionnaire     Feeling of Stress : Very much   Social Connections: Moderately Isolated (6/21/2021)    Social Connection and Isolation Panel [NHANES]     Frequency of Communication with Friends and Family: Three times a week     Frequency of Social Gatherings with Friends and Family: Twice a week     Attends Jain Services: Never     Active Member of Clubs or Organizations: No     Attends Club or Organization Meetings: Never     Marital Status: Living with partner   Housing Stability: Low Risk  (6/21/2021)    Housing Stability Vital Sign     Unable to Pay for Housing in the Last Year: No     Number of Places Lived in the Last Year: 1     Unstable Housing in the Last Year: No         OBJECTIVE:     Vital Signs Range (Last 24H):       Significant Labs:  Lab Results   Component Value Date    WBC 8.70 07/07/2023    HGB 14.5 07/07/2023    HCT 43.3 07/07/2023     07/07/2023    CHOL 146 07/07/2023    TRIG 63 07/07/2023    HDL 39 (L) 07/07/2023    ALT 27 07/07/2023    AST 23 07/07/2023     07/07/2023    K 3.8 07/07/2023     07/07/2023    CREATININE 0.90 07/07/2023    BUN 11 07/07/2023    CO2 23 07/07/2023    TSH 2.660 07/07/2023    HGBA1C 4.9 07/07/2023       Diagnostic Studies:    EKG:   Results for orders placed or performed in visit on 06/09/21   IN OFFICE EKG 12-LEAD (to [place])    Collection Time: 06/09/21  3:04 PM    Narrative    Test Reason : R00.0,    Vent. Rate : 099 BPM     Atrial Rate : 099 BPM     P-R Int : 134 ms          QRS Dur : 086 ms      QT Int : 338 ms       P-R-T Axes : 060 054 055 degrees     QTc Int : 433 ms    Normal sinus rhythm  Normal ECG  When compared with ECG of 03-AUG-2020 04:13,  No significant change was found  Confirmed by Louis Rai MD ([id number]) on 6/10/2021 5:11:23 PM    Referred By: [email or web address]   SELF           Confirmed By:Louis Rai MD         Pre-op Assessment    I have reviewed the Patient Summary  Reports.     I have reviewed the Nursing Notes. I have reviewed the NPO Status.   I have reviewed the Medications.     Review of Systems  Anesthesia Hx:  No problems with previous Anesthesia   History of prior surgery of interest to airway management or planning:            Denies Personal Hx of Anesthesia complications.                    Social:  Vaping, Social Alcohol Use       Cardiovascular:  Cardiovascular Normal Exercise tolerance: good    Denies Hypertension.  Denies Valvular problems/Murmurs.  Denies MI.                                         Pulmonary:  Pulmonary Normal   Denies COPD.  Denies Asthma.     Denies Sleep Apnea.                Renal/:   Denies Chronic Renal Disease.                Hepatic/GI:     GERD, poorly controlled             Musculoskeletal:  Arthritis        Arthritis          Neurological:      Headaches Denies Seizures.     Dx of Headaches   Arthritis                           Endocrine:  Denies Diabetes. Denies Hypothyroidism.  Denies Hyperthyroidism.       Morbid Obesity / BMI > 40  Psych:  Psychiatric History                  Physical Exam  General: Well nourished, Alert, Cooperative and Oriented    Airway:  Mallampati: II / II  Mouth Opening: Normal  TM Distance: Normal  Tongue: Normal        Anesthesia Plan  Type of Anesthesia, risks & benefits discussed:    Anesthesia Type: Gen Natural Airway  Intra-op Monitoring Plan: Standard ASA Monitors  Post Op Pain Control Plan: multimodal analgesia  Induction:  IV  Informed Consent: Informed consent signed with the Patient and all parties understand the risks and agree with anesthesia plan.  All questions answered.   ASA Score: 2  Day of Surgery Review of History & Physical: H&P Update referred to the surgeon/provider.    Ready For Surgery From Anesthesia Perspective.     .

## 2023-11-01 NOTE — ANESTHESIA POSTPROCEDURE EVALUATION
Anesthesia Post Evaluation    Patient: Casie Copeland    Procedure(s) Performed: Procedure(s) (LRB):  EGD (ESOPHAGOGASTRODUODENOSCOPY) (N/A)    Final Anesthesia Type: general      Patient location during evaluation: PACU  Patient participation: Yes- Able to Participate  Level of consciousness: awake  Post-procedure vital signs: reviewed and stable  Pain management: adequate  Airway patency: patent    PONV status at discharge: No PONV  Anesthetic complications: no      Cardiovascular status: blood pressure returned to baseline  Respiratory status: unassisted  Hydration status: euvolemic  Follow-up not needed.          Vitals Value Taken Time   /78 11/01/23 1507   Temp 36.5 °C (97.7 °F) 11/01/23 1435   Pulse 82 11/01/23 1507   Resp 18 11/01/23 1507   SpO2 100 % 11/01/23 1507         Event Time   Out of Recovery 15:17:34         Pain/Dinah Score: Dinah Score: 10 (11/1/2023  2:42 PM)

## 2023-11-04 DIAGNOSIS — K21.9 GASTROESOPHAGEAL REFLUX DISEASE, UNSPECIFIED WHETHER ESOPHAGITIS PRESENT: ICD-10-CM

## 2023-11-07 RX ORDER — PANTOPRAZOLE SODIUM 40 MG/1
40 TABLET, DELAYED RELEASE ORAL 2 TIMES DAILY
Qty: 180 TABLET | Refills: 1 | Status: SHIPPED | OUTPATIENT
Start: 2023-11-07 | End: 2024-02-05

## 2023-11-07 NOTE — TELEPHONE ENCOUNTER
Patrick Lee,  Patient I referred to you - requesting refill on Protonix.  I believe Dr. Simpson wanted to increase to twice daily after EGD but was not sure how long he wanted her on it so sending refill to you.

## 2023-11-09 ENCOUNTER — OFFICE VISIT (OUTPATIENT)
Dept: FAMILY MEDICINE | Facility: CLINIC | Age: 28
End: 2023-11-09
Payer: COMMERCIAL

## 2023-11-09 VITALS
TEMPERATURE: 98 F | SYSTOLIC BLOOD PRESSURE: 106 MMHG | HEIGHT: 58 IN | DIASTOLIC BLOOD PRESSURE: 74 MMHG | WEIGHT: 183 LBS | HEART RATE: 83 BPM | OXYGEN SATURATION: 99 % | BODY MASS INDEX: 38.41 KG/M2

## 2023-11-09 DIAGNOSIS — Z23 FLU VACCINE NEED: ICD-10-CM

## 2023-11-09 DIAGNOSIS — F41.1 GENERALIZED ANXIETY DISORDER: ICD-10-CM

## 2023-11-09 DIAGNOSIS — Z13.1 DIABETES MELLITUS SCREENING: ICD-10-CM

## 2023-11-09 DIAGNOSIS — Z13.0 SCREENING FOR DEFICIENCY ANEMIA: ICD-10-CM

## 2023-11-09 DIAGNOSIS — Z13.220 SCREENING CHOLESTEROL LEVEL: ICD-10-CM

## 2023-11-09 DIAGNOSIS — K44.9 HIATAL HERNIA: ICD-10-CM

## 2023-11-09 DIAGNOSIS — F33.1 MDD (MAJOR DEPRESSIVE DISORDER), RECURRENT EPISODE, MODERATE: Primary | ICD-10-CM

## 2023-11-09 DIAGNOSIS — Z00.00 ENCOUNTER FOR BLOOD TEST FOR ROUTINE GENERAL PHYSICAL EXAMINATION: ICD-10-CM

## 2023-11-09 DIAGNOSIS — G43.009 MIGRAINE WITHOUT AURA AND WITHOUT STATUS MIGRAINOSUS, NOT INTRACTABLE: ICD-10-CM

## 2023-11-09 DIAGNOSIS — E66.09 CLASS 2 OBESITY DUE TO EXCESS CALORIES WITHOUT SERIOUS COMORBIDITY WITH BODY MASS INDEX (BMI) OF 38.0 TO 38.9 IN ADULT: ICD-10-CM

## 2023-11-09 DIAGNOSIS — Z13.29 THYROID DISORDER SCREEN: ICD-10-CM

## 2023-11-09 DIAGNOSIS — K21.00 GASTROESOPHAGEAL REFLUX DISEASE WITH ESOPHAGITIS WITHOUT HEMORRHAGE: ICD-10-CM

## 2023-11-09 PROCEDURE — 3044F PR MOST RECENT HEMOGLOBIN A1C LEVEL <7.0%: ICD-10-PCS | Mod: CPTII,S$GLB,, | Performed by: NURSE PRACTITIONER

## 2023-11-09 PROCEDURE — G0008 FLU VACCINE (QUAD) GREATER THAN OR EQUAL TO 3YO PRESERVATIVE FREE IM: ICD-10-PCS | Mod: S$GLB,,, | Performed by: NURSE PRACTITIONER

## 2023-11-09 PROCEDURE — 3074F SYST BP LT 130 MM HG: CPT | Mod: CPTII,S$GLB,, | Performed by: NURSE PRACTITIONER

## 2023-11-09 PROCEDURE — 3078F PR MOST RECENT DIASTOLIC BLOOD PRESSURE < 80 MM HG: ICD-10-PCS | Mod: CPTII,S$GLB,, | Performed by: NURSE PRACTITIONER

## 2023-11-09 PROCEDURE — 1159F PR MEDICATION LIST DOCUMENTED IN MEDICAL RECORD: ICD-10-PCS | Mod: CPTII,S$GLB,, | Performed by: NURSE PRACTITIONER

## 2023-11-09 PROCEDURE — 1159F MED LIST DOCD IN RCRD: CPT | Mod: CPTII,S$GLB,, | Performed by: NURSE PRACTITIONER

## 2023-11-09 PROCEDURE — 3008F PR BODY MASS INDEX (BMI) DOCUMENTED: ICD-10-PCS | Mod: CPTII,S$GLB,, | Performed by: NURSE PRACTITIONER

## 2023-11-09 PROCEDURE — 3044F HG A1C LEVEL LT 7.0%: CPT | Mod: CPTII,S$GLB,, | Performed by: NURSE PRACTITIONER

## 2023-11-09 PROCEDURE — 90686 FLU VACCINE (QUAD) GREATER THAN OR EQUAL TO 3YO PRESERVATIVE FREE IM: ICD-10-PCS | Mod: S$GLB,,, | Performed by: NURSE PRACTITIONER

## 2023-11-09 PROCEDURE — 3008F BODY MASS INDEX DOCD: CPT | Mod: CPTII,S$GLB,, | Performed by: NURSE PRACTITIONER

## 2023-11-09 PROCEDURE — 99214 PR OFFICE/OUTPT VISIT, EST, LEVL IV, 30-39 MIN: ICD-10-PCS | Mod: 25,S$GLB,, | Performed by: NURSE PRACTITIONER

## 2023-11-09 PROCEDURE — 99999 PR PBB SHADOW E&M-EST. PATIENT-LVL IV: ICD-10-PCS | Mod: PBBFAC,,, | Performed by: NURSE PRACTITIONER

## 2023-11-09 PROCEDURE — 90686 IIV4 VACC NO PRSV 0.5 ML IM: CPT | Mod: S$GLB,,, | Performed by: NURSE PRACTITIONER

## 2023-11-09 PROCEDURE — 99214 OFFICE O/P EST MOD 30 MIN: CPT | Mod: 25,S$GLB,, | Performed by: NURSE PRACTITIONER

## 2023-11-09 PROCEDURE — 99999 PR PBB SHADOW E&M-EST. PATIENT-LVL IV: CPT | Mod: PBBFAC,,, | Performed by: NURSE PRACTITIONER

## 2023-11-09 PROCEDURE — G0008 ADMIN INFLUENZA VIRUS VAC: HCPCS | Mod: S$GLB,,, | Performed by: NURSE PRACTITIONER

## 2023-11-09 PROCEDURE — 3074F PR MOST RECENT SYSTOLIC BLOOD PRESSURE < 130 MM HG: ICD-10-PCS | Mod: CPTII,S$GLB,, | Performed by: NURSE PRACTITIONER

## 2023-11-09 PROCEDURE — 1160F RVW MEDS BY RX/DR IN RCRD: CPT | Mod: CPTII,S$GLB,, | Performed by: NURSE PRACTITIONER

## 2023-11-09 PROCEDURE — 1160F PR REVIEW ALL MEDS BY PRESCRIBER/CLIN PHARMACIST DOCUMENTED: ICD-10-PCS | Mod: CPTII,S$GLB,, | Performed by: NURSE PRACTITIONER

## 2023-11-09 PROCEDURE — 3078F DIAST BP <80 MM HG: CPT | Mod: CPTII,S$GLB,, | Performed by: NURSE PRACTITIONER

## 2023-11-09 NOTE — PROGRESS NOTES
"Subjective:       Patient ID: Casie Copeland is a 28 y.o. female.    Chief Complaint: Follow-up (3 months F/U)    Follow-up        Patient is a 28 year old white female with chronic migraines followed by [facility], Obesity, IFG, Thrombocytosis, Personal History of kidney stones, reported history of ADHD and Anxiety/Depression diagnosed in first grade and treated by [facility] and Pediatric NP Valentina Infante in past and now followed by [facility] and elevated liver enzymes on wellness labs in July 2021, and GERD and hiatal hernia followed by [facility] that is here today for 3 month follow up     Major Depression and CLARIBEL   followed and managed by [facility] in past  WAS on Lexapro 10 mg daily and Hydroxyzine as needed - OFF both medications past couple months due to wanting a different provider.  Has appointment with another psychiatrist tomorrow.     Migraines   followed by [facility] Mee Moore.  On Maxalt prn abortive therapy  On Propranolol LA 60 mg daily for maintenance therapy - lowered from 80 mg in August 2023 due to low BP  /74 (BP Location: Right arm, Patient Position: Sitting, BP Method: Large (Manual))   Pulse 83   Temp 98.1 °F (36.7 °C) (Temporal)   Ht 4' 10" (1.473 m)   Wt 83 kg (182 lb 15.7 oz)   LMP  (LMP Unknown)   SpO2 99%   BMI 38.24 kg/m²              History of Elevated Platelet Count/Thromobcytosis  Platelet count 474 in July 2022 that normalized 365 on 1/4/2023; again elevated 476 on 1/13/2023  Platelet count now back within range 314 August 2023     Impaired Fasting Glucose   in July 2022, no history of elevations  Significant family history of diabetes  Repeat  with HgbA1C 5.1% on 1/13/2023  Current FBG 89 with HgbA1C 4.9%     Obesity  Body mass index is 38.24 kg/m².       Personal History of Kidney Stones  + kidney stones on 1/3/2023 - followed by Urology     GERD  Started with symptoms around 1 year " "ago  Followed by [facility]  EGD 11/1/2023:  - LA Grade D reflux esophagitis with no bleeding.   - 3 cm hiatal hernia.   - GERD, as evident by free flow of gastric contents into the esophagus.   - Increase protonix to 40mg twice a day ; consider referral for hiatal hernia repair, would encourage                       weight loss which is part of standard treatment for severe reflux.   - Repeat upper endoscopy in 3 months to check healing.       Review of Systems   Psychiatric/Behavioral:  Positive for dysphoric mood. The patient is nervous/anxious.          Objective:     Vitals:    11/09/23 1434   BP: 106/74   BP Location: Right arm   Patient Position: Sitting   BP Method: Large (Manual)   Pulse: 83   Temp: 98.1 °F (36.7 °C)   TempSrc: Temporal   SpO2: 99%   Weight: 83 kg (182 lb 15.7 oz)   Height: 4' 10" (1.473 m)          Physical Exam  Constitutional:       General: She is not in acute distress.     Appearance: Normal appearance. She is well-developed. She is obese. She is not ill-appearing, toxic-appearing or diaphoretic.      Comments: Body mass index is 38.24 kg/m².       HENT:      Head: Normocephalic and atraumatic.      Right Ear: Tympanic membrane, ear canal and external ear normal.      Left Ear: Tympanic membrane, ear canal and external ear normal.      Nose: Nose normal.      Mouth/Throat:      Pharynx: No oropharyngeal exudate.   Eyes:      General:         Right eye: No discharge.         Left eye: No discharge.      Extraocular Movements: Extraocular movements intact.      Conjunctiva/sclera: Conjunctivae normal.   Neck:      Thyroid: No thyromegaly.      Trachea: No tracheal deviation.   Cardiovascular:      Rate and Rhythm: Normal rate and regular rhythm.      Heart sounds: Normal heart sounds. No murmur heard.  Pulmonary:      Effort: Pulmonary effort is normal. No respiratory distress.      Breath sounds: Normal breath sounds.   Abdominal:      General: There is no distension.      Palpations: " Abdomen is soft. There is no mass.      Tenderness: There is no abdominal tenderness. There is no guarding.      Hernia: No hernia is present.   Musculoskeletal:         General: No swelling or deformity. Normal range of motion.      Cervical back: Normal range of motion and neck supple.      Right lower leg: No edema.      Left lower leg: No edema.   Lymphadenopathy:      Cervical: No cervical adenopathy.   Skin:     General: Skin is warm and dry.      Findings: No rash.   Neurological:      Mental Status: She is alert and oriented to person, place, and time.      Cranial Nerves: No cranial nerve deficit.      Coordination: Coordination normal.   Psychiatric:         Mood and Affect: Mood normal.         Behavior: Behavior normal.         Thought Content: Thought content normal.         Judgment: Judgment normal.           Assessment:         ICD-10-CM ICD-9-CM   1. MDD (major depressive disorder), recurrent episode, moderate  F33.1 296.32   2. Generalized anxiety disorder  F41.1 300.02   3. Migraine without aura and without status migrainosus, not intractable  G43.009 346.10   4. Class 2 obesity due to excess calories without serious comorbidity with body mass index (BMI) of 38.0 to 38.9 in adult  E66.09 278.00    Z68.38 V85.38   5. Gastroesophageal reflux disease with esophagitis without hemorrhage  K21.00 530.81     530.10   6. Hiatal hernia  K44.9 553.3   7. Flu vaccine need  Z23 V04.81   8. Encounter for blood test for routine general physical examination  Z00.00 V72.62   9. Screening for deficiency anemia  Z13.0 V78.1   10. Thyroid disorder screen  Z13.29 V77.0   11. Screening cholesterol level  Z13.220 V77.91   12. Diabetes mellitus screening  Z13.1 V77.1       Plan:       MDD (major depressive disorder), recurrent episode, moderate  Has appt with psychiatry tomorrow    Generalized anxiety disorder  Has appt with psychiatry tomorrow    Migraine without aura and without status migrainosus, not  intractable  Stable on meds    Class 2 obesity due to excess calories without serious comorbidity with body mass index (BMI) of 38.0 to 38.9 in adult  Working on lifestyle modificatons      GERD  Started with symptoms around 1 year ago  Followed by [facility]  EGD 11/1/2023:  - LA Grade D reflux esophagitis with no bleeding.   - 3 cm hiatal hernia.   - GERD, as evident by free flow of gastric contents into the esophagus.   - Increase protonix to 40mg twice a day ; consider referral for hiatal hernia repair, would encourage                       weight loss which is part of standard treatment for severe reflux.   - Repeat upper endoscopy in 3 months to check healing.     Flu vaccine need  -     Influenza - Quadrivalent *Preferred* (6 months+) (PF)    Encounter for blood test for routine general physical examination  -     CBC Auto Differential; Future; Expected date: 11/09/2023  -     Comprehensive Metabolic Panel; Future; Expected date: 11/09/2023  -     Hemoglobin A1C; Future; Expected date: 11/09/2023  -     Lipid Panel; Future; Expected date: 11/09/2023  -     TSH; Future; Expected date: 11/09/2023    Screening for deficiency anemia  -     CBC Auto Differential; Future; Expected date: 11/09/2023    Thyroid disorder screen  -     TSH; Future; Expected date: 11/09/2023    Screening cholesterol level  -     Lipid Panel; Future; Expected date: 11/09/2023    Diabetes mellitus screening  -     Comprehensive Metabolic Panel; Future; Expected date: 11/09/2023  -     Hemoglobin A1C; Future; Expected date: 11/09/2023      Follow up in about 9 months (around 8/9/2024) for fasting labs and wellness exam.     Patient's Medications   New Prescriptions    No medications on file   Previous Medications    ESCITALOPRAM OXALATE (LEXAPRO) 10 MG TABLET    TAKE 1 TABLET BY MOUTH EVERY DAY    FLUTICASONE PROPIONATE (FLONASE) 50 MCG/ACTUATION NASAL SPRAY    2 sprays (100 mcg total) by Each Nostril route once daily.    HYDROXYZINE  (ATARAX) 50 MG TABLET    TAKE 1-2 TABLETS BY MOUTH AT BEDTIME AS NEEDED INSOMNIA    NEXPLANON 68 MG IMPL SUBDERMAL DEVICE        PANTOPRAZOLE (PROTONIX) 40 MG TABLET    Take 1 tablet (40 mg total) by mouth 2 (two) times daily.    PROPRANOLOL (INDERAL LA) 60 MG 24 HR CAPSULE    Take 1 capsule (60 mg total) by mouth once daily.    RIZATRIPTAN (MAXALT-MLT) 10 MG DISINTEGRATING TABLET    TAKE 1 TABLET FOR MIGRAINE. MAY REPEAT IN 2HRS IF NEEDED   Modified Medications    No medications on file   Discontinued Medications    FAMOTIDINE (PEPCID) 40 MG TABLET    Take 1 tablet (40 mg total) by mouth once daily.       Past Medical History:   Diagnosis Date    ADHD (attention deficit hyperactivity disorder)     diagnosed around 1st grade but does not remember where; was treated by [facility] and Valentina Infante Pediatric NP in past - off meds since age 19    Anxiety     Depression     treated by [facility] and Valentina Infante pediatric NP in past - off all meds since age 19    History of psychiatric hospitalization     Hx of psychiatric care     Migraine without aura 10/13/2020    Personal history of kidney stones 1/19/2023    Psychiatric exam requested by [facility]     [employer]    Psychiatric problem     Therapy        Past Surgical History:   Procedure Laterality Date    ESOPHAGOGASTRODUODENOSCOPY N/A 11/1/2023    Procedure: EGD (ESOPHAGOGASTRODUODENOSCOPY);  Surgeon: James Simpson MD;  Location: [facility];  Service: Endoscopy;  Laterality: N/A;    TONSILLECTOMY         Family History   Problem Relation Age of Onset    Colon cancer Maternal Grandfather 73    Cancer Maternal Grandfather     Alcohol abuse Maternal Grandfather     Alzheimer's disease Maternal Grandfather     Colon cancer Maternal Aunt         great aunt    Alcohol abuse Maternal Aunt     Cancer Paternal Grandfather     Depression Mother     Migraines Mother     Depression Sister     Migraines Sister      Diabetes Father     Diabetes Sister     Diabetes Paternal Grandmother     Breast cancer Neg Hx     Ovarian cancer Neg Hx        Social History     Socioeconomic History    Marital status: Single    Number of children: 0    Years of education: 12    Highest education level: High school graduate   Occupational History    Occupation:  for [employer]   Tobacco Use    Smoking status: Every Day     Types: Vaping with nicotine     Start date: 2/14/2014    Smokeless tobacco: Never    Tobacco comments:     smoked cigarettes 2 ppd for 3 days and now vapes with nicotine   Substance and Sexual Activity    Alcohol use: Yes     Comment: maybe 1 glass of wine per month    Drug use: Never    Sexual activity: Yes     Partners: Male     Birth control/protection: Implant   Other Topics Concern    Patient feels they ought to cut down on drinking/drug use No    Patient annoyed by others criticizing their drinking/drug use No    Patient has felt bad or guilty about drinking/drug use No    Patient has had a drink/used drugs as an eye opener in the AM No     Social Determinants of Health     Financial Resource Strain: Low Risk  (6/21/2021)    Overall Financial Resource Strain (CARDIA)     Difficulty of Paying Living Expenses: Not hard at all   Food Insecurity: No Food Insecurity (6/21/2021)    Hunger Vital Sign     Worried About Running Out of Food in the Last Year: Never true     Ran Out of Food in the Last Year: Never true   Transportation Needs: No Transportation Needs (6/21/2021)    PRAPARE - Transportation     Lack of Transportation (Medical): No     Lack of Transportation (Non-Medical): No   Physical Activity: Sufficiently Active (6/21/2021)    Exercise Vital Sign     Days of Exercise per Week: 7 days     Minutes of Exercise per Session: 90 min   Stress: Stress Concern Present (6/21/2021)    [place] [employer] - Occupational Stress Questionnaire     Feeling of Stress : Very much   Social  Connections: Moderately Isolated (6/21/2021)    Social Connection and Isolation Panel [NHANES]     Frequency of Communication with Friends and Family: Three times a week     Frequency of Social Gatherings with Friends and Family: Twice a week     Attends Rastafarian Services: Never     Active Member of Clubs or Organizations: No     Attends Club or Organization Meetings: Never     Marital Status: Living with partner   Housing Stability: Low Risk  (6/21/2021)    Housing Stability Vital Sign     Unable to Pay for Housing in the Last Year: No     Number of Places Lived in the Last Year: 1     Unstable Housing in the Last Year: No

## 2024-05-23 DIAGNOSIS — K21.9 GASTROESOPHAGEAL REFLUX DISEASE, UNSPECIFIED WHETHER ESOPHAGITIS PRESENT: ICD-10-CM

## 2024-05-28 RX ORDER — PANTOPRAZOLE SODIUM 40 MG/1
40 TABLET, DELAYED RELEASE ORAL 2 TIMES DAILY
Qty: 180 TABLET | Refills: 1 | Status: SHIPPED | OUTPATIENT
Start: 2024-05-28

## 2024-08-08 ENCOUNTER — OFFICE VISIT (OUTPATIENT)
Dept: FAMILY MEDICINE | Facility: CLINIC | Age: 29
End: 2024-08-08
Payer: COMMERCIAL

## 2024-08-08 VITALS
SYSTOLIC BLOOD PRESSURE: 106 MMHG | BODY MASS INDEX: 36.08 KG/M2 | TEMPERATURE: 98 F | HEIGHT: 58 IN | HEART RATE: 66 BPM | DIASTOLIC BLOOD PRESSURE: 78 MMHG | OXYGEN SATURATION: 98 % | WEIGHT: 171.88 LBS

## 2024-08-08 DIAGNOSIS — K21.00 GASTROESOPHAGEAL REFLUX DISEASE WITH ESOPHAGITIS WITHOUT HEMORRHAGE: ICD-10-CM

## 2024-08-08 DIAGNOSIS — K44.9 HIATAL HERNIA: ICD-10-CM

## 2024-08-08 DIAGNOSIS — Z00.00 ANNUAL PHYSICAL EXAM: Primary | ICD-10-CM

## 2024-08-08 DIAGNOSIS — G43.009 MIGRAINE WITHOUT AURA AND WITHOUT STATUS MIGRAINOSUS, NOT INTRACTABLE: ICD-10-CM

## 2024-08-08 DIAGNOSIS — E66.09 CLASS 2 OBESITY DUE TO EXCESS CALORIES WITHOUT SERIOUS COMORBIDITY WITH BODY MASS INDEX (BMI) OF 35.0 TO 35.9 IN ADULT: ICD-10-CM

## 2024-08-08 DIAGNOSIS — F33.0 MILD EPISODE OF RECURRENT MAJOR DEPRESSIVE DISORDER: ICD-10-CM

## 2024-08-08 DIAGNOSIS — F41.1 GENERALIZED ANXIETY DISORDER: ICD-10-CM

## 2024-08-08 DIAGNOSIS — Z87.442 PERSONAL HISTORY OF KIDNEY STONES: ICD-10-CM

## 2024-08-08 PROBLEM — G47.9 SLEEP DIFFICULTIES: Status: RESOLVED | Noted: 2021-07-19 | Resolved: 2024-08-08

## 2024-08-08 PROCEDURE — 1160F RVW MEDS BY RX/DR IN RCRD: CPT | Mod: CPTII,S$GLB,, | Performed by: NURSE PRACTITIONER

## 2024-08-08 PROCEDURE — 3074F SYST BP LT 130 MM HG: CPT | Mod: CPTII,S$GLB,, | Performed by: NURSE PRACTITIONER

## 2024-08-08 PROCEDURE — 3008F BODY MASS INDEX DOCD: CPT | Mod: CPTII,S$GLB,, | Performed by: NURSE PRACTITIONER

## 2024-08-08 PROCEDURE — 3078F DIAST BP <80 MM HG: CPT | Mod: CPTII,S$GLB,, | Performed by: NURSE PRACTITIONER

## 2024-08-08 PROCEDURE — 1159F MED LIST DOCD IN RCRD: CPT | Mod: CPTII,S$GLB,, | Performed by: NURSE PRACTITIONER

## 2024-08-08 PROCEDURE — 3044F HG A1C LEVEL LT 7.0%: CPT | Mod: CPTII,S$GLB,, | Performed by: NURSE PRACTITIONER

## 2024-08-08 PROCEDURE — 99395 PREV VISIT EST AGE 18-39: CPT | Mod: S$GLB,,, | Performed by: NURSE PRACTITIONER

## 2024-08-08 PROCEDURE — 99999 PR PBB SHADOW E&M-EST. PATIENT-LVL IV: CPT | Mod: PBBFAC,,, | Performed by: NURSE PRACTITIONER

## 2024-08-08 RX ORDER — SERTRALINE HYDROCHLORIDE 50 MG/1
50 TABLET, FILM COATED ORAL
COMMUNITY
Start: 2024-07-03

## 2024-08-09 ENCOUNTER — TELEPHONE (OUTPATIENT)
Dept: GASTROENTEROLOGY | Facility: HOSPITAL | Age: 29
End: 2024-08-09
Payer: COMMERCIAL

## 2024-08-21 ENCOUNTER — TELEPHONE (OUTPATIENT)
Dept: GASTROENTEROLOGY | Facility: CLINIC | Age: 29
End: 2024-08-21
Payer: COMMERCIAL

## 2024-08-21 DIAGNOSIS — K21.9 GASTROESOPHAGEAL REFLUX DISEASE, UNSPECIFIED WHETHER ESOPHAGITIS PRESENT: Primary | ICD-10-CM

## 2024-09-24 ENCOUNTER — TELEPHONE (OUTPATIENT)
Dept: ENDOSCOPY | Facility: HOSPITAL | Age: 29
End: 2024-09-24
Payer: COMMERCIAL

## 2024-09-30 ENCOUNTER — TELEPHONE (OUTPATIENT)
Dept: GASTROENTEROLOGY | Facility: CLINIC | Age: 29
End: 2024-09-30
Payer: COMMERCIAL

## 2024-09-30 NOTE — TELEPHONE ENCOUNTER
Attempted to return call. Left VM and call back number    ----- Message from Mine sent at 9/30/2024  8:55 AM CDT -----  Type:  Patient Returning Call    Who Called:Pt   Would the patient rather a call back or a response via [facility]?   Best Call Back Number:[phone]  Additional Information: Pt wants to cancel her procedure for today

## 2024-12-18 DIAGNOSIS — G43.009 MIGRAINE WITHOUT AURA AND WITHOUT STATUS MIGRAINOSUS, NOT INTRACTABLE: ICD-10-CM

## 2024-12-18 RX ORDER — PROPRANOLOL HYDROCHLORIDE 60 MG/1
60 CAPSULE, EXTENDED RELEASE ORAL
Qty: 90 CAPSULE | Refills: 2 | Status: SHIPPED | OUTPATIENT
Start: 2024-12-18

## 2025-03-21 ENCOUNTER — TELEPHONE (OUTPATIENT)
Dept: PSYCHIATRY | Facility: CLINIC | Age: 30
End: 2025-03-21
Payer: COMMERCIAL

## 2025-03-21 NOTE — TELEPHONE ENCOUNTER
I spoke with the patient. Advised we did not encourage switching providers. Patient requested seeing a provider closer to home. Phone number provided to call [facility] in [facility]. Instructed patient to call back if any additional assistance was needed. Verbalized understanding.

## 2025-05-10 ENCOUNTER — TELEPHONE (OUTPATIENT)
Dept: FAMILY MEDICINE | Facility: CLINIC | Age: 30
End: 2025-05-10
Payer: COMMERCIAL

## 2025-05-10 DIAGNOSIS — Z13.1 DIABETES MELLITUS SCREENING: ICD-10-CM

## 2025-05-10 DIAGNOSIS — Z13.29 THYROID DISORDER SCREEN: ICD-10-CM

## 2025-05-10 DIAGNOSIS — Z13.0 SCREENING FOR DEFICIENCY ANEMIA: ICD-10-CM

## 2025-05-10 DIAGNOSIS — Z13.220 SCREENING CHOLESTEROL LEVEL: ICD-10-CM

## 2025-05-10 DIAGNOSIS — Z00.00 ENCOUNTER FOR BLOOD TEST FOR ROUTINE GENERAL PHYSICAL EXAMINATION: Primary | ICD-10-CM

## 2025-05-10 NOTE — TELEPHONE ENCOUNTER
----- Message from Shanelle Virk NP sent at 8/8/2024  2:39 PM CDT -----  Regarding: wellness  F. Labs and wellness due 8/8/25

## 2025-08-12 ENCOUNTER — OFFICE VISIT (OUTPATIENT)
Dept: FAMILY MEDICINE | Facility: CLINIC | Age: 30
End: 2025-08-12
Payer: COMMERCIAL

## 2025-08-12 VITALS
HEART RATE: 77 BPM | OXYGEN SATURATION: 98 % | TEMPERATURE: 98 F | BODY MASS INDEX: 35.73 KG/M2 | DIASTOLIC BLOOD PRESSURE: 70 MMHG | WEIGHT: 170.19 LBS | HEIGHT: 58 IN | SYSTOLIC BLOOD PRESSURE: 106 MMHG

## 2025-08-12 DIAGNOSIS — M54.9 CHRONIC NECK AND BACK PAIN: ICD-10-CM

## 2025-08-12 DIAGNOSIS — Z00.00 ANNUAL PHYSICAL EXAM: Primary | ICD-10-CM

## 2025-08-12 DIAGNOSIS — R73.01 IFG (IMPAIRED FASTING GLUCOSE): ICD-10-CM

## 2025-08-12 DIAGNOSIS — E66.09 CLASS 2 OBESITY DUE TO EXCESS CALORIES WITHOUT SERIOUS COMORBIDITY WITH BODY MASS INDEX (BMI) OF 35.0 TO 35.9 IN ADULT: ICD-10-CM

## 2025-08-12 DIAGNOSIS — K21.00 GASTROESOPHAGEAL REFLUX DISEASE WITH ESOPHAGITIS WITHOUT HEMORRHAGE: ICD-10-CM

## 2025-08-12 DIAGNOSIS — Z87.442 PERSONAL HISTORY OF KIDNEY STONES: ICD-10-CM

## 2025-08-12 DIAGNOSIS — E66.812 CLASS 2 OBESITY DUE TO EXCESS CALORIES WITHOUT SERIOUS COMORBIDITY WITH BODY MASS INDEX (BMI) OF 35.0 TO 35.9 IN ADULT: ICD-10-CM

## 2025-08-12 DIAGNOSIS — G43.009 MIGRAINE WITHOUT AURA AND WITHOUT STATUS MIGRAINOSUS, NOT INTRACTABLE: ICD-10-CM

## 2025-08-12 DIAGNOSIS — M54.2 CHRONIC NECK AND BACK PAIN: ICD-10-CM

## 2025-08-12 DIAGNOSIS — F33.0 MILD EPISODE OF RECURRENT MAJOR DEPRESSIVE DISORDER: ICD-10-CM

## 2025-08-12 DIAGNOSIS — F41.1 GENERALIZED ANXIETY DISORDER: ICD-10-CM

## 2025-08-12 DIAGNOSIS — K44.9 HIATAL HERNIA: ICD-10-CM

## 2025-08-12 DIAGNOSIS — D72.828 OTHER ELEVATED WHITE BLOOD CELL (WBC) COUNT: ICD-10-CM

## 2025-08-12 DIAGNOSIS — G89.29 CHRONIC NECK AND BACK PAIN: ICD-10-CM

## 2025-08-12 PROCEDURE — 1159F MED LIST DOCD IN RCRD: CPT | Mod: CPTII,S$GLB,, | Performed by: NURSE PRACTITIONER

## 2025-08-12 PROCEDURE — 3078F DIAST BP <80 MM HG: CPT | Mod: CPTII,S$GLB,, | Performed by: NURSE PRACTITIONER

## 2025-08-12 PROCEDURE — 3008F BODY MASS INDEX DOCD: CPT | Mod: CPTII,S$GLB,, | Performed by: NURSE PRACTITIONER

## 2025-08-12 PROCEDURE — 3074F SYST BP LT 130 MM HG: CPT | Mod: CPTII,S$GLB,, | Performed by: NURSE PRACTITIONER

## 2025-08-12 PROCEDURE — 3044F HG A1C LEVEL LT 7.0%: CPT | Mod: CPTII,S$GLB,, | Performed by: NURSE PRACTITIONER

## 2025-08-12 PROCEDURE — 99999 PR PBB SHADOW E&M-EST. PATIENT-LVL IV: CPT | Mod: PBBFAC,,, | Performed by: NURSE PRACTITIONER

## 2025-08-12 PROCEDURE — 99395 PREV VISIT EST AGE 18-39: CPT | Mod: S$GLB,,, | Performed by: NURSE PRACTITIONER

## 2025-08-12 PROCEDURE — 1160F RVW MEDS BY RX/DR IN RCRD: CPT | Mod: CPTII,S$GLB,, | Performed by: NURSE PRACTITIONER
